# Patient Record
Sex: FEMALE | Race: WHITE | NOT HISPANIC OR LATINO
[De-identification: names, ages, dates, MRNs, and addresses within clinical notes are randomized per-mention and may not be internally consistent; named-entity substitution may affect disease eponyms.]

---

## 2021-10-12 ENCOUNTER — RESULT REVIEW (OUTPATIENT)
Age: 76
End: 2021-10-12

## 2022-08-04 ENCOUNTER — FORM ENCOUNTER (OUTPATIENT)
Age: 77
End: 2022-08-04

## 2022-08-05 PROBLEM — Z00.00 ENCOUNTER FOR PREVENTIVE HEALTH EXAMINATION: Status: ACTIVE | Noted: 2022-08-05

## 2024-05-23 ENCOUNTER — NON-APPOINTMENT (OUTPATIENT)
Age: 79
End: 2024-05-23

## 2024-07-06 ENCOUNTER — NON-APPOINTMENT (OUTPATIENT)
Age: 79
End: 2024-07-06

## 2025-03-31 ENCOUNTER — INPATIENT (INPATIENT)
Facility: HOSPITAL | Age: 80
LOS: 3 days | Discharge: HOME | End: 2025-04-04
Attending: INTERNAL MEDICINE | Admitting: INTERNAL MEDICINE
Payer: MEDICARE

## 2025-03-31 VITALS
TEMPERATURE: 98 F | HEART RATE: 80 BPM | RESPIRATION RATE: 19 BRPM | HEIGHT: 62 IN | DIASTOLIC BLOOD PRESSURE: 52 MMHG | WEIGHT: 171.96 LBS | SYSTOLIC BLOOD PRESSURE: 121 MMHG | OXYGEN SATURATION: 98 %

## 2025-03-31 DIAGNOSIS — K12.2 CELLULITIS AND ABSCESS OF MOUTH: ICD-10-CM

## 2025-03-31 PROCEDURE — G0545: CPT

## 2025-03-31 PROCEDURE — 99223 1ST HOSP IP/OBS HIGH 75: CPT

## 2025-03-31 PROCEDURE — 99233 SBSQ HOSP IP/OBS HIGH 50: CPT

## 2025-03-31 PROCEDURE — G0316 PROLONG INPT EVAL ADD15 M: CPT

## 2025-03-31 NOTE — H&P ADULT - NSHPLABSRESULTS_GEN_ALL_CORE
7.6    3.44  )-----------( 163      ( 01 Apr 2025 01:25 )             22.0     04-01    132[L]  |  100  |  10  ----------------------------<  112[H]  3.7   |  18[L]  |  0.77    Ca    7.7[L]      01 Apr 2025 01:25  Phos  1.4     04-01  Mg     2.20     04-01    TPro  6.1  /  Alb  3.0[L]  /  TBili  0.3  /  DBili  x   /  AST  21  /  ALT  13  /  AlkPhos  84  04-01      Urinalysis Basic - ( 01 Apr 2025 01:25 )    Color: x / Appearance: x / SG: x / pH: x  Gluc: 112 mg/dL / Ketone: x  / Bili: x / Urobili: x   Blood: x / Protein: x / Nitrite: x   Leuk Esterase: x / RBC: x / WBC x   Sq Epi: x / Non Sq Epi: x / Bacteria: x    RADIOLOGY, EKG & ADDITIONAL TESTS: Reviewed.

## 2025-03-31 NOTE — H&P ADULT - PROBLEM SELECTOR PLAN 3
CTA chest (03/28/2025) demonstrated no acute PE, diffuse osteosclerotic metastatic disease, 4 x 2.6 cm mass inferior quadrant left breast containing coarse calcifications and 0.7 cm short axis left axillary LN. CTA chest (03/28/2025) demonstrated no acute PE, diffuse osteosclerotic metastatic disease, 4 x 2.6 cm mass inferior quadrant left breast containing coarse calcifications and 0.7 cm short axis left axillary LN Hx of stage IV breast cancer. ER+/AR+/Her2 negative with metastases to the bones, ?colon on Ibrance/Femara since 11/2021 now with L3 lesion on Xgeva  Follows with Dr. Cunha at [facility]  Recommended to hold Ibrance during hospitalization as per Oncology recommendations   CTA chest (03/28/2025) demonstrated no acute PE, diffuse osteosclerotic metastatic disease, 4 x 2.6 cm mass inferior quadrant left breast containing coarse calcifications and 0.7 cm short axis left axillary LN    Letrozole 2.5 mg daily

## 2025-03-31 NOTE — H&P ADULT - NSHPREVIEWOFSYSTEMS_GEN_ALL_CORE
CONSTITUTIONAL: No weakness, fevers or chills  EYES/ENT: No visual changes; No dysphagia; No sore throat; No rhinorrhea; No sinus pain/pressure  NECK: No pain or stiffness  RESPIRATORY: No cough, wheezing, hemoptysis; No shortness of breath  CARDIOVASCULAR: No chest pain or palpitations; No lower extremity edema  GASTROINTESTINAL: No abdominal or epigastric pain. No nausea, vomiting, or hematemesis; No diarrhea or constipation. No melena or hematochezia.  GENITOURINARY: No dysuria, frequency or hematuria  NEUROLOGICAL: No numbness, paresthesias, or weakness; No HA; No LH/dizziness  SKIN: No itching, burning, rashes, or lesions

## 2025-03-31 NOTE — H&P ADULT - PROBLEM SELECTOR PLAN 6
Cr: 1.26 --> 0.96 Na: 130 --> 129   Serum osm: 274 Na: 132    Follow urine studies   Serum osm pending   Monitor Na

## 2025-03-31 NOTE — H&P ADULT - NSHPPHYSICALEXAM_GEN_ALL_CORE
General: Elderly woman lying in bed comfortably. Is in no acute distress  Eyes: PERRL, EOMI  Resp: CTA b/l. No wheezing, rales or rhonchi  CVS: RRR. S1 & S2+. No murmurs. rubs or gallops  GI: Soft. NDNT. Bowel sounds x 4 quadrants +  Extremities: No edema. cyanosis or clubbing  Neuro: Alert and oriented x 3. Motor and sensory intact General: Elderly woman lying in bed comfortably. Is in no acute distress  Eyes: PERRL, EOMI  HEENT: Submandibular erythema with purulent drainage   Resp: CTA b/l. No wheezing, rales or rhonchi  CVS: RRR. S1 & S2+. No murmurs. rubs or gallops  GI: Soft. NDNT. Bowel sounds x 4 quadrants +  Extremities: No edema. cyanosis or clubbing  Neuro: Alert and oriented x 3. Motor and sensory intact General: Elderly woman lying in bed comfortably. Is in no acute distress  Eyes: PERRL, EOMI  HEENT: Submandibular erythema, tenderness and swelling with scab and purulent drainage.   Resp: CTA b/l. No wheezing, rales or rhonchi  CVS: RRR. S1 & S2+. No murmurs. rubs or gallops  GI: Soft. NDNT. Bowel sounds x 4 quadrants +  Extremities: No edema. cyanosis or clubbing  Neuro: Alert and oriented x 3. Motor and sensory intact

## 2025-03-31 NOTE — H&P ADULT - HISTORY OF PRESENT ILLNESS
78 y/o F with PMHx of metastatic breast cancer (follows with [facility]), HTN, CAD (s/p 1 stent in 2013) presented to the ED at [facility] complaining of 1 week of dyspnea along with several days of submandibular tenderness and erythema. She stated that she has been experiencing dyspnea at rest for 1 week with worsening dyspnea on exertion. She also has been experiencing dental pain and increasing tenderness in the submandibular region with overlying erythema. Dental pain was 5/10 and well controlled with Tylenol at home. She denied fevers/chills or recent dental procedure. She also has occasional leg swelling for which she was briefly taking Lasix for but stopped due to low sodium. In [place] ED patient was noted to be in AF with RVR which resolved after Lopressor was administered.  78 y/o F with PMHx of metastatic breast cancer (follows with [facility]), HTN, CAD (s/p 1 stent in 2013) presented to the ED at [facility] complaining of 1 week of dyspnea along with several days of submandibular tenderness and erythema. She stated that she has been experiencing dyspnea at rest for 1 week with worsening dyspnea on exertion. She also has been experiencing dental pain and increasing tenderness in the submandibular region with overlying erythema. Dental pain was 5/10 and well controlled with Tylenol at home. She denied fevers/chills or recent dental procedure. She also has occasional leg swelling for which she was briefly taking Lasix for but stopped due to low sodium. In [place] ED patient was noted to be in AF with RVR and received Lopressor which slowed down her HR and eventually patient spontaneously went back into NSR. She was evaluated by Cardiology at [place] who recommended to monitor and obtain TTE.   She was also started on vancomycin and Zosyn for management of submandibular cellulitis. She was noted to have an open wound in the submandibular region with several small openings with purulent drainage. ID was consulted and recommended transfer to tertiary facility for possible surgical intervention as she has a large submandibular abscess/furuncle of dental origin and to continue Zosyn and vancomycin.   Case was discussed with [facility] and ENT (Dr. Dalal) will evaluate the patient. She was discharged from [facility] on 03/31/2025.     She also had an OMKAR and hyponatremia for which she was treated with gentle IVF.   She was also evaluated by GI at [place] due to 1 month history of black stool and noted to be anemic during admission. She was started on Protonix BID and GI recommended eventual EGD and Colonoscopy to rule out PUD and neoplasia but that submandibular infection should be evaluated first.  78 y/o F with PMHx of metastatic breast cancer (follows with [facility]), HTN, CAD (s/p 1 stent in 2013) presented to the ED at [facility] complaining of 1 week of dyspnea along with several days of submandibular tenderness and erythema. She stated that she has been experiencing dyspnea at rest for 1 week with worsening dyspnea on exertion. She also has been experiencing dental pain and increasing tenderness in the submandibular region with overlying erythema. Dental pain was 5/10 and well controlled with Tylenol at home. She denied fevers/chills or recent dental procedure. She also has occasional leg swelling for which she was briefly taking Lasix for but stopped due to low sodium. In [place] ED patient was noted to be in AF with RVR and received Lopressor which slowed down her HR and eventually patient spontaneously went back into NSR. She was evaluated by Cardiology at [place] who recommended to monitor and obtain TTE. She also had an OMKAR and hyponatremia for which she was treated with gentle IVF. She was also evaluated by GI at [place] due to 1 month history of black stool and noted to be anemic during admission. She was started on Protonix BID and GI recommended eventual EGD and Colonoscopy to rule out PUD and neoplasia but that submandibular infection should be evaluated first. She was also started on vancomycin and Zosyn for management of submandibular cellulitis. She was noted to have an open wound in the submandibular region with several small openings with purulent drainage. ID was consulted and recommended transfer to tertiary facility for possible surgical intervention as she has a large submandibular abscess/furuncle of dental origin and to continue Zosyn and vancomycin.   Case was discussed with [facility] and ENT (Dr. Dalal) will evaluate the patient. She was discharged from [facility] on 03/31/2025.

## 2025-03-31 NOTE — PATIENT PROFILE ADULT - NSTRANSFERBELONGINGSRESP_GEN_A_NUR
January 17, 2019      Vic Muñoz MD  [id number]  [place] [address]           [facility]  [address]  [place] [address]  Phone: [phone]  Fax: [phone]          Patient: Ottoniel Ross   MR Number: [id number]   YOB: 1962   Date of Visit: 1/17/2019       Dear Dr. Vic Muñoz:    Thank you for referring Ottoniel Ross to me for evaluation. Attached you will find relevant portions of my assessment and plan of care.    If you have questions, please do not hesitate to call me. I look forward to following Ottoniel Ross along with you.    Sincerely,    Nicole Santamaria MD    Enclosure  CC:  No Recipients    If you would like to receive this communication electronically, please contact [email or web address] or [phone] to request more information on [email or web address] Link access.    For providers and/or their staff who would like to refer a patient to [facility], please contact us through our one-stop-shop provider referral line, [facility], at [phone].    If you feel you have received this communication in error or would no longer like to receive these types of communications, please e-mail [email or web address]         
yes

## 2025-03-31 NOTE — PATIENT PROFILE ADULT - NSPROMEDSADMININFO_GEN_A_NUR
How Severe Is Your Cyst?: moderate Is This A New Presentation, Or A Follow-Up?: Cyst Additional History: Saw PCP when it first started and he prescribed doxycycline 100mg for 14 days and had no improvement. He also took a round of augmentin without improvement. no concerns

## 2025-03-31 NOTE — H&P ADULT - PROBLEM SELECTOR PLAN 8
DVT prophylaxis:   GI prophylaxis: Protonix BID  Diet: DASH, soft bite sized  Ethics: Full code  Disposition: Pending clinical course DVT prophylaxis: Hold Eliquis pending possible intervention by ENT (last dose on 03/31/25 around 5:30 PM)  GI prophylaxis: Protonix BID  Diet: DASH, soft bite sized  Ethics: Full code  Disposition: Pending clinical course

## 2025-03-31 NOTE — PATIENT PROFILE ADULT - FALL HARM RISK - RISK INTERVENTIONS
Assistance OOB with selected safe patient handling equipment/Assistance with ambulation/Communicate Fall Risk and Risk Factors to all staff, patient, and family/Monitor for mental status changes/Monitor gait and stability/Reinforce activity limits and safety measures with patient and family/Reorient to person, place and time as needed/Review medications for side effects contributing to fall risk/Sit up slowly, dangle for a short time, stand at bedside before walking/Toileting schedule using arm’s reach rule for commode and bathroom/Use of alarms - bed, chair and/or voice tab/Visual Cue: Yellow wristband/Bed in lowest position, wheels locked, appropriate side rails in place/Call bell, personal items and telephone in reach/Instruct patient to call for assistance before getting out of bed or chair/Non-slip footwear when patient is out of bed/[place] to call system/Physically safe environment - no spills, clutter or unnecessary equipment/Purposeful Proactive Rounding/Room/bathroom lighting operational, light cord in reach

## 2025-03-31 NOTE — H&P ADULT - PROBLEM SELECTOR PLAN 2
EKG (03/29/25) demonstrated AF with RVR   pro-BNP: 3042  TSH: 0.690 EKG (03/29/25) demonstrated AF with RVR   pro-BNP: 3042  TSH: 0.690    EKG pending EKG (03/29/25) demonstrated AF with RVR   pro-BNP: 3042  TSH: 0.690  Evaluated by Cardiology who recommended to continue Eliquis   CHADsVASc score of     EKG pending  Hold Eliquis (due to possible ENT intervention) EKG (03/29/25) demonstrated AF with RVR   pro-BNP: 3042  TSH: 0.690  Evaluated by Cardiology who recommended to continue Eliquis   CHADsVASc score of 3    EKG pending  Hold Eliquis (due to possible ENT intervention)

## 2025-03-31 NOTE — H&P ADULT - PROBLEM SELECTOR PLAN 1
CT soft tissue neck C+ (03/28/2025) demonstrated submandibular space superficial and deep infiltration suggestive of cellulitis with no abscess, asymmetric fullness left aryepiglottic fold and diffuse sclerotic metastases.   ESR: 70  Lactate: 3.40 --> 2.00  Procalcitonin: 1.14   CRP: 285  Blood cultures (03/28/25) demonstrated NGTD  Wound culture (03/29/25) demonstrated growth with few Parvimonas micra and Peptostreptococcus species CT soft tissue neck C+ (03/28/2025) demonstrated submandibular space superficial and deep infiltration suggestive of cellulitis with no abscess, asymmetric fullness left aryepiglottic fold and diffuse sclerotic metastases.   ESR: 70  Lactate: 3.40 --> 2.00  Procalcitonin: 1.14   CRP: 285  Blood cultures (03/28/25) demonstrated NGTD  Wound culture (03/29/25) demonstrated growth with few Parvimonas micra and Peptostreptococcus species    Zosyn   Vancomycin  Monitor VT or AUC and adjust accordingly   Monitor BMP  ID consult in the AM   ENT consult in the AM Transferred to [facility] for ENT evaluation for possible I & D of possible submandibular abscess/furuncle   CT soft tissue neck C+ (03/28/2025) demonstrated submandibular space superficial and deep infiltration suggestive of cellulitis with no abscess, asymmetric fullness left aryepiglottic fold and diffuse sclerotic metastases.   ESR: 70  Lactate: 3.40 --> 2.00  Procalcitonin: 1.14   CRP: 285  Blood cultures (03/28/25) demonstrated NGTD  Wound culture (03/29/25) demonstrated growth with few Parvimonas micra and Peptostreptococcus species    Zosyn   Vancomycin  Monitor VT or AUC and adjust accordingly   Monitor BMP  ID consult in the AM   ENT consult in the AM Transferred to [facility] for ENT evaluation for possible I & D of possible submandibular abscess/furuncle   CT soft tissue neck C+ (03/28/2025) demonstrated submandibular space superficial and deep infiltration suggestive of cellulitis with no abscess, asymmetric fullness left aryepiglottic fold and diffuse sclerotic metastases.   ESR: 70  Lactate: 3.40 --> 2.00  Procalcitonin: 1.14   CRP: 285  Blood cultures (03/28/25) demonstrated NGTD  Wound culture (03/29/25) demonstrated growth with few Parvimonas micra and Peptostreptococcus species    Zosyn 4.5 g q 8   Vancomycin 1 gm q 24   Monitor VT or AUC and adjust accordingly   Monitor BMP  ID consult in the AM   ENT consult in the AM

## 2025-03-31 NOTE — H&P ADULT - PROBLEM SELECTOR PLAN 4
Amlodipine 5 mg daily   Monitor BP and adjust accordingly Home medications: Amlodipine 5 mg daily     Amlodipine 5 mg daily   Monitor BP and adjust accordingly

## 2025-03-31 NOTE — H&P ADULT - ASSESSMENT
78 y/o F with PMHx of metastatic breast cancer (follows with [facility]), HTN, CAD (s/p 1 stent in 2013) presented to the ED at [facility] complaining of 1 week of dyspnea along with several days of submandibular tenderness and erythema. She stated that she has been experiencing dyspnea at rest for 1 week with worsening dyspnea on exertion. She also has been experiencing dental pain and increasing tenderness in the submandibular region with overlying erythema.  ID was consulted and recommended transfer to tertiary facility for possible surgical intervention as she has a large submandibular abscess/furuncle of dental origin and to continue Zosyn and vancomycin. Case was discussed with [facility] and ENT (Dr. Dalal) will evaluate the patient. She was discharged from [facility] on 03/31/2025. She will be admitted for management of submandibular cellulitis and for evaluation by ID and ENT.

## 2025-03-31 NOTE — H&P ADULT - PROBLEM SELECTOR PLAN 5
Hgb: 9.9 --> 8.0  Iron: 27   TIBC: 188.86  Ferritin: 344  B12: 2447   FA: 13.20 Hgb: 9.9 --> 8.0  Iron: 27   TIBC: 188.86  Ferritin: 344  B12: 2447   FA: 13.20    GI consult in the AM Hgb: 9.9 --> 8.0 --> 7.6   Iron: 27   TIBC: 188.86  Ferritin: 344  B12: 2447   FA: 13.20  Complained of 1 month history of black stool   Evaluated by GI at [facility] who recommended EGD/Colonoscopy once submandibular cellulitis is treated and to continue Protonix BID   Continues to have black stool     Monitor H&H   Maintain active type and screen   Transfuse if Hgb<8 g/dL   Monitor for further active signs of bleeding   Protonix BID   GI consult in the AM

## 2025-04-01 DIAGNOSIS — I10 ESSENTIAL (PRIMARY) HYPERTENSION: ICD-10-CM

## 2025-04-01 DIAGNOSIS — N17.9 ACUTE KIDNEY FAILURE, UNSPECIFIED: ICD-10-CM

## 2025-04-01 DIAGNOSIS — I48.91 UNSPECIFIED ATRIAL FIBRILLATION: ICD-10-CM

## 2025-04-01 DIAGNOSIS — E87.1 HYPO-OSMOLALITY AND HYPONATREMIA: ICD-10-CM

## 2025-04-01 DIAGNOSIS — Z29.9 ENCOUNTER FOR PROPHYLACTIC MEASURES, UNSPECIFIED: ICD-10-CM

## 2025-04-01 DIAGNOSIS — I25.10 ATHEROSCLEROTIC HEART DISEASE OF NATIVE CORONARY ARTERY WITHOUT ANGINA PECTORIS: ICD-10-CM

## 2025-04-01 DIAGNOSIS — K12.2 CELLULITIS AND ABSCESS OF MOUTH: ICD-10-CM

## 2025-04-01 DIAGNOSIS — D64.9 ANEMIA, UNSPECIFIED: ICD-10-CM

## 2025-04-01 LAB
ADD ON TEST-SPECIMEN IN LAB: SIGNIFICANT CHANGE UP
ALBUMIN SERPL ELPH-MCNC: 3 G/DL — LOW (ref 3.3–5)
ALP SERPL-CCNC: 84 U/L — SIGNIFICANT CHANGE UP (ref 40–120)
ALT FLD-CCNC: 13 U/L — SIGNIFICANT CHANGE UP (ref 4–33)
ANION GAP SERPL CALC-SCNC: 14 MMOL/L — SIGNIFICANT CHANGE UP (ref 7–14)
ANION GAP SERPL CALC-SCNC: 16 MMOL/L — HIGH (ref 7–14)
APTT BLD: 33.9 SEC — SIGNIFICANT CHANGE UP (ref 24.5–35.6)
AST SERPL-CCNC: 21 U/L — SIGNIFICANT CHANGE UP (ref 4–32)
BILIRUB SERPL-MCNC: 0.3 MG/DL — SIGNIFICANT CHANGE UP (ref 0.2–1.2)
BLD GP AB SCN SERPL QL: NEGATIVE — SIGNIFICANT CHANGE UP
BUN SERPL-MCNC: 10 MG/DL — SIGNIFICANT CHANGE UP (ref 7–23)
BUN SERPL-MCNC: 8 MG/DL — SIGNIFICANT CHANGE UP (ref 7–23)
CALCIUM SERPL-MCNC: 7.6 MG/DL — LOW (ref 8.4–10.5)
CALCIUM SERPL-MCNC: 7.7 MG/DL — LOW (ref 8.4–10.5)
CHLORIDE SERPL-SCNC: 100 MMOL/L — SIGNIFICANT CHANGE UP (ref 98–107)
CHLORIDE SERPL-SCNC: 102 MMOL/L — SIGNIFICANT CHANGE UP (ref 98–107)
CO2 SERPL-SCNC: 18 MMOL/L — LOW (ref 22–31)
CO2 SERPL-SCNC: 18 MMOL/L — LOW (ref 22–31)
CREAT SERPL-MCNC: 0.73 MG/DL — SIGNIFICANT CHANGE UP (ref 0.5–1.3)
CREAT SERPL-MCNC: 0.77 MG/DL — SIGNIFICANT CHANGE UP (ref 0.5–1.3)
EGFR: 77 ML/MIN/1.73M2 — SIGNIFICANT CHANGE UP
EGFR: 77 ML/MIN/1.73M2 — SIGNIFICANT CHANGE UP
EGFR: 83 ML/MIN/1.73M2 — SIGNIFICANT CHANGE UP
EGFR: 83 ML/MIN/1.73M2 — SIGNIFICANT CHANGE UP
FERRITIN SERPL-MCNC: 565 NG/ML — HIGH (ref 13–330)
GLUCOSE SERPL-MCNC: 108 MG/DL — HIGH (ref 70–99)
GLUCOSE SERPL-MCNC: 112 MG/DL — HIGH (ref 70–99)
HCT VFR BLD CALC: 21.9 % — LOW (ref 34.5–45)
HCT VFR BLD CALC: 22 % — LOW (ref 34.5–45)
HGB BLD-MCNC: 7.1 G/DL — LOW (ref 11.5–15.5)
HGB BLD-MCNC: 7.6 G/DL — LOW (ref 11.5–15.5)
INR BLD: 1.33 RATIO — HIGH (ref 0.85–1.16)
IRON SATN MFR SERPL: 22 % — SIGNIFICANT CHANGE UP (ref 14–50)
IRON SATN MFR SERPL: 41 UG/DL — SIGNIFICANT CHANGE UP (ref 30–160)
MAGNESIUM SERPL-MCNC: 2.2 MG/DL — SIGNIFICANT CHANGE UP (ref 1.6–2.6)
MAGNESIUM SERPL-MCNC: 2.3 MG/DL — SIGNIFICANT CHANGE UP (ref 1.6–2.6)
MCHC RBC-ENTMCNC: 32.4 G/DL — SIGNIFICANT CHANGE UP (ref 32–36)
MCHC RBC-ENTMCNC: 34.5 G/DL — SIGNIFICANT CHANGE UP (ref 32–36)
MCHC RBC-ENTMCNC: 38.4 PG — HIGH (ref 27–34)
MCHC RBC-ENTMCNC: 39.4 PG — HIGH (ref 27–34)
MCV RBC AUTO: 114 FL — HIGH (ref 80–100)
MCV RBC AUTO: 118.4 FL — HIGH (ref 80–100)
NRBC # BLD AUTO: 0 K/UL — SIGNIFICANT CHANGE UP (ref 0–0)
NRBC # BLD AUTO: 0.02 K/UL — HIGH (ref 0–0)
NRBC # FLD: 0 K/UL — SIGNIFICANT CHANGE UP (ref 0–0)
NRBC # FLD: 0.02 K/UL — HIGH (ref 0–0)
NRBC BLD AUTO-RTO: 0 /100 WBCS — SIGNIFICANT CHANGE UP (ref 0–0)
NRBC BLD AUTO-RTO: 0 /100 WBCS — SIGNIFICANT CHANGE UP (ref 0–0)
OSMOLALITY SERPL: 282 MOSM/KG — SIGNIFICANT CHANGE UP (ref 275–295)
PHOSPHATE SERPL-MCNC: 1.4 MG/DL — LOW (ref 2.5–4.5)
PHOSPHATE SERPL-MCNC: 1.5 MG/DL — LOW (ref 2.5–4.5)
PLATELET # BLD AUTO: 134 K/UL — LOW (ref 150–400)
PLATELET # BLD AUTO: 163 K/UL — SIGNIFICANT CHANGE UP (ref 150–400)
POTASSIUM SERPL-MCNC: 3.7 MMOL/L — SIGNIFICANT CHANGE UP (ref 3.5–5.3)
POTASSIUM SERPL-MCNC: 3.8 MMOL/L — SIGNIFICANT CHANGE UP (ref 3.5–5.3)
POTASSIUM SERPL-SCNC: 3.7 MMOL/L — SIGNIFICANT CHANGE UP (ref 3.5–5.3)
POTASSIUM SERPL-SCNC: 3.8 MMOL/L — SIGNIFICANT CHANGE UP (ref 3.5–5.3)
PROT SERPL-MCNC: 6.1 G/DL — SIGNIFICANT CHANGE UP (ref 6–8.3)
PROTHROM AB SERPL-ACNC: 15.8 SEC — HIGH (ref 9.9–13.4)
RBC # BLD: 1.85 M/UL — LOW (ref 3.8–5.2)
RBC # BLD: 1.93 M/UL — LOW (ref 3.8–5.2)
RBC # FLD: 14 % — SIGNIFICANT CHANGE UP (ref 10.3–14.5)
RBC # FLD: 14.1 % — SIGNIFICANT CHANGE UP (ref 10.3–14.5)
RH IG SCN BLD-IMP: NEGATIVE — SIGNIFICANT CHANGE UP
SODIUM SERPL-SCNC: 132 MMOL/L — LOW (ref 135–145)
SODIUM SERPL-SCNC: 136 MMOL/L — SIGNIFICANT CHANGE UP (ref 135–145)
TIBC SERPL-MCNC: 183 UG/DL — LOW (ref 220–430)
UIBC SERPL-MCNC: 142 UG/DL — SIGNIFICANT CHANGE UP (ref 110–370)
WBC # BLD: 2.92 K/UL — LOW (ref 3.8–10.5)
WBC # BLD: 3.44 K/UL — LOW (ref 3.8–10.5)
WBC # FLD AUTO: 2.92 K/UL — LOW (ref 3.8–10.5)
WBC # FLD AUTO: 3.44 K/UL — LOW (ref 3.8–10.5)

## 2025-04-01 PROCEDURE — 99233 SBSQ HOSP IP/OBS HIGH 50: CPT

## 2025-04-01 PROCEDURE — 99222 1ST HOSP IP/OBS MODERATE 55: CPT | Mod: GC

## 2025-04-01 PROCEDURE — 99223 1ST HOSP IP/OBS HIGH 75: CPT | Mod: FS,25

## 2025-04-01 RX ORDER — VANCOMYCIN HCL IN 5 % DEXTROSE 1.5G/250ML
1000 PLASTIC BAG, INJECTION (ML) INTRAVENOUS EVERY 24 HOURS
Refills: 0 | Status: DISCONTINUED | OUTPATIENT
Start: 2025-04-01 | End: 2025-04-02

## 2025-04-01 RX ORDER — CYANOCOBALAMIN 1000 UG/ML
2000 INJECTION INTRAMUSCULAR; SUBCUTANEOUS
Refills: 0 | DISCHARGE

## 2025-04-01 RX ORDER — OXYCODONE HYDROCHLORIDE 30 MG/1
5 TABLET ORAL EVERY 6 HOURS
Refills: 0 | Status: DISCONTINUED | OUTPATIENT
Start: 2025-04-01 | End: 2025-04-03

## 2025-04-01 RX ORDER — LETROZOLE 2.5 MG/1
2.5 TABLET, FILM COATED ORAL DAILY
Refills: 0 | Status: DISCONTINUED | OUTPATIENT
Start: 2025-04-01 | End: 2025-04-04

## 2025-04-01 RX ORDER — PIPERACILLIN-TAZO-DEXTROSE,ISO 3.375G/5
4.5 IV SOLUTION, PIGGYBACK PREMIX FROZEN(ML) INTRAVENOUS EVERY 8 HOURS
Refills: 0 | Status: DISCONTINUED | OUTPATIENT
Start: 2025-04-01 | End: 2025-04-02

## 2025-04-01 RX ORDER — ACETAMINOPHEN 500 MG/5ML
1000 LIQUID (ML) ORAL ONCE
Refills: 0 | Status: DISCONTINUED | OUTPATIENT
Start: 2025-04-01 | End: 2025-04-01

## 2025-04-01 RX ORDER — MELATONIN 5 MG
3 TABLET ORAL AT BEDTIME
Refills: 0 | Status: DISCONTINUED | OUTPATIENT
Start: 2025-04-01 | End: 2025-04-04

## 2025-04-01 RX ORDER — ATORVASTATIN CALCIUM 80 MG/1
20 TABLET, FILM COATED ORAL AT BEDTIME
Refills: 0 | Status: DISCONTINUED | OUTPATIENT
Start: 2025-04-01 | End: 2025-04-04

## 2025-04-01 RX ORDER — AMLODIPINE BESYLATE 10 MG/1
1 TABLET ORAL
Refills: 0 | DISCHARGE

## 2025-04-01 RX ORDER — CYANOCOBALAMIN 1000 UG/ML
1000 INJECTION INTRAMUSCULAR; SUBCUTANEOUS DAILY
Refills: 0 | Status: DISCONTINUED | OUTPATIENT
Start: 2025-04-01 | End: 2025-04-04

## 2025-04-01 RX ORDER — ONDANSETRON HCL/PF 4 MG/2 ML
4 VIAL (ML) INJECTION EVERY 8 HOURS
Refills: 0 | Status: DISCONTINUED | OUTPATIENT
Start: 2025-04-01 | End: 2025-04-04

## 2025-04-01 RX ORDER — METOPROLOL SUCCINATE 50 MG/1
25 TABLET, EXTENDED RELEASE ORAL EVERY 6 HOURS
Refills: 0 | Status: DISCONTINUED | OUTPATIENT
Start: 2025-04-01 | End: 2025-04-04

## 2025-04-01 RX ORDER — MAGNESIUM, ALUMINUM HYDROXIDE 200-200 MG
30 TABLET,CHEWABLE ORAL EVERY 4 HOURS
Refills: 0 | Status: DISCONTINUED | OUTPATIENT
Start: 2025-04-01 | End: 2025-04-04

## 2025-04-01 RX ORDER — ATORVASTATIN CALCIUM 80 MG/1
1 TABLET, FILM COATED ORAL
Refills: 0 | DISCHARGE

## 2025-04-01 RX ORDER — LETROZOLE 2.5 MG/1
1 TABLET, FILM COATED ORAL
Refills: 0 | DISCHARGE

## 2025-04-01 RX ORDER — POTASSIUM PHOSPHATE, MONOBASIC POTASSIUM PHOSPHATE, DIBASIC INJECTION, 236; 224 MG/ML; MG/ML
30 SOLUTION, CONCENTRATE INTRAVENOUS ONCE
Refills: 0 | Status: COMPLETED | OUTPATIENT
Start: 2025-04-01 | End: 2025-04-01

## 2025-04-01 RX ORDER — AMLODIPINE BESYLATE 10 MG/1
5 TABLET ORAL DAILY
Refills: 0 | Status: DISCONTINUED | OUTPATIENT
Start: 2025-04-01 | End: 2025-04-04

## 2025-04-01 RX ORDER — DULOXETINE 20 MG/1
1 CAPSULE, DELAYED RELEASE ORAL
Refills: 0 | DISCHARGE

## 2025-04-01 RX ORDER — DULOXETINE 20 MG/1
30 CAPSULE, DELAYED RELEASE ORAL DAILY
Refills: 0 | Status: DISCONTINUED | OUTPATIENT
Start: 2025-04-01 | End: 2025-04-04

## 2025-04-01 RX ADMIN — LETROZOLE 2.5 MILLIGRAM(S): 2.5 TABLET, FILM COATED ORAL at 13:06

## 2025-04-01 RX ADMIN — METOPROLOL SUCCINATE 25 MILLIGRAM(S): 50 TABLET, EXTENDED RELEASE ORAL at 05:49

## 2025-04-01 RX ADMIN — POTASSIUM PHOSPHATE, MONOBASIC POTASSIUM PHOSPHATE, DIBASIC INJECTION, 83.33 MILLIMOLE(S): 236; 224 SOLUTION, CONCENTRATE INTRAVENOUS at 18:21

## 2025-04-01 RX ADMIN — Medication 40 MILLIGRAM(S): at 05:49

## 2025-04-01 RX ADMIN — Medication 40 MILLIGRAM(S): at 18:28

## 2025-04-01 RX ADMIN — Medication 25 GRAM(S): at 13:50

## 2025-04-01 RX ADMIN — Medication 25 GRAM(S): at 04:35

## 2025-04-01 RX ADMIN — Medication 25 GRAM(S): at 21:12

## 2025-04-01 RX ADMIN — AMLODIPINE BESYLATE 5 MILLIGRAM(S): 10 TABLET ORAL at 05:49

## 2025-04-01 RX ADMIN — Medication 85 MILLIMOLE(S): at 05:48

## 2025-04-01 RX ADMIN — ATORVASTATIN CALCIUM 20 MILLIGRAM(S): 80 TABLET, FILM COATED ORAL at 21:12

## 2025-04-01 RX ADMIN — METOPROLOL SUCCINATE 25 MILLIGRAM(S): 50 TABLET, EXTENDED RELEASE ORAL at 13:05

## 2025-04-01 RX ADMIN — DULOXETINE 30 MILLIGRAM(S): 20 CAPSULE, DELAYED RELEASE ORAL at 13:06

## 2025-04-01 RX ADMIN — Medication 250 MILLIGRAM(S): at 16:04

## 2025-04-01 RX ADMIN — CYANOCOBALAMIN 1000 MICROGRAM(S): 1000 INJECTION INTRAMUSCULAR; SUBCUTANEOUS at 13:06

## 2025-04-01 RX ADMIN — Medication 1000 UNIT(S): at 13:06

## 2025-04-01 NOTE — PROGRESS NOTE ADULT - ASSESSMENT
78 y/o F with PMHx of metastatic breast cancer (follows with [facility]), HTN, CAD (s/p 1 stent in 2013) presented to the ED at [facility] complaining of 1 week of dyspnea along with several days of submandibular tenderness and erythema. She stated that she has been experiencing dyspnea at rest for 1 week with worsening dyspnea on exertion. She also has been experiencing dental pain and increasing tenderness in the submandibular region with overlying erythema.  ID was consulted and recommended transfer to tertiary facility for possible surgical intervention as she has a large submandibular abscess/furuncle of dental origin and to continue Zosyn and vancomycin. Case was discussed with [facility] and ENT (Dr. Dalal) will evaluate the patient. She was discharged from [facility] on 03/31/2025. She will be admitted for management of submandibular cellulitis and for evaluation by ID and ENT.

## 2025-04-01 NOTE — CONSULT NOTE ADULT - SUBJECTIVE AND OBJECTIVE BOX
Reason for consult: metastatic breast cancer     HPI:  78 y/o F with PMHx of metastatic breast cancer (follows with [facility]), HTN, CAD (s/p 1 stent in 2013) presented to the ED at [facility] complaining of 1 week of dyspnea along with several days of submandibular tenderness and erythema. She stated that she has been experiencing dyspnea at rest for 1 week with worsening dyspnea on exertion. She also has been experiencing dental pain and increasing tenderness in the submandibular region with overlying erythema. Dental pain was 5/10 and well controlled with Tylenol at home. She denied fevers/chills or recent dental procedure. She also has occasional leg swelling for which she was briefly taking Lasix for but stopped due to low sodium. In [place] ED patient was noted to be in AF with RVR and received Lopressor which slowed down her HR and eventually patient spontaneously went back into NSR. She was evaluated by Cardiology at [place] who recommended to monitor and obtain TTE. She also had an OMKAR and hyponatremia for which she was treated with gentle IVF. She was also evaluated by GI at [place] due to 1 month history of black stool and noted to be anemic during admission. She was started on Protonix BID and GI recommended eventual EGD and Colonoscopy to rule out PUD and neoplasia but that submandibular infection should be evaluated first. She was also started on vancomycin and Zosyn for management of submandibular cellulitis. She was noted to have an open wound in the submandibular region with several small openings with purulent drainage. ID was consulted and recommended transfer to tertiary facility for possible surgical intervention as she has a large submandibular abscess/furuncle of dental origin and to continue Zosyn and vancomycin.   Case was discussed with [facility] and ENT (Dr. Dalal) will evaluate the patient. She was discharged from [facility] on 03/31/2025.      (31 Mar 2025 23:46)    Hematology/Oncology consulted on this 79 year old female with metastatic breast cancer who was transferred from [facility] for evaluation/treatment of submandibular wound. Patient is under care of Dr. Bam Cunha of [facility] for management of her stage IV breast cancer metastatic to bones and ?colon. She was initially diagnosed via biopsy of right iliac confirming diagnosis, and has been on palbociclib and letrozole since.   Patient seen this afternoon, with her two daughters at bedside. She has some discomfort to chin area where the wound is, but states that since it started draining, the swelling and pain has improved drastically. No other complaints currently.     PAST MEDICAL & SURGICAL HISTORY:  Breast cancer      HTN (hypertension)      CAD (coronary artery disease)          FAMILY HISTORY:      Alochol: Denied  Smoking: Nonsmoker  Drug Use: Denied  Marital Status:         Allergies    Vicodin (Unknown)    Intolerances        MEDICATIONS  (STANDING):  amLODIPine   Tablet 5 milliGRAM(s) Oral daily  atorvastatin 20 milliGRAM(s) Oral at bedtime  cholecalciferol 1000 Unit(s) Oral daily  cyanocobalamin 1000 MICROGram(s) Oral daily  DULoxetine 30 milliGRAM(s) Oral daily  letrozole 2.5 milliGRAM(s) Oral daily  metoprolol tartrate 25 milliGRAM(s) Oral every 6 hours  pantoprazole  Injectable 40 milliGRAM(s) IV Push every 12 hours  piperacillin/tazobactam IVPB.. 4.5 Gram(s) IV Intermittent every 8 hours  potassium phosphate IVPB 30 milliMole(s) IV Intermittent once  vancomycin  IVPB 1000 milliGRAM(s) IV Intermittent every 24 hours    MEDICATIONS  (PRN):  aluminum hydroxide/magnesium hydroxide/simethicone Suspension 30 milliLiter(s) Oral every 4 hours PRN Dyspepsia  melatonin 3 milliGRAM(s) Oral at bedtime PRN Insomnia  ondansetron Injectable 4 milliGRAM(s) IV Push every 8 hours PRN Nausea and/or Vomiting  oxyCODONE    IR 5 milliGRAM(s) Oral every 6 hours PRN for severe pain      ROS  No fever, sweats, chills  No epistaxis, HA, sore throat  No CP, SOB, cough, sputum  No n/v/d, abd pain, melena, hematochezia  No edema  No rash  No anxiety  No back pain, joint pain  No bleeding, bruising  No dysuria, hematuria  +chin/submandibular area discomfort     T(C): 36.5 (04-01-25 @ 12:59), Max: 36.8 (03-31-25 @ 22:41)  HR: 75 (04-01-25 @ 12:59) (75 - 85)  BP: 117/58 (04-01-25 @ 12:59) (117/58 - 149/56)  RR: 18 (04-01-25 @ 12:59) (18 - 19)  SpO2: 100% (04-01-25 @ 12:59) (98% - 100%)  Wt(kg): --    PE  NAD  Awake, alert  Anicteric, MMM  purulent drainage from submandibular region w/ swelling   No c/c/e  No rash grossly  FROM                          7.1    2.92  )-----------( 134      ( 01 Apr 2025 06:25 )             21.9       04-01    136  |  102  |  8   ----------------------------<  108[H]  3.8   |  18[L]  |  0.73    Ca    7.6[L]      01 Apr 2025 06:25  Phos  1.5     04-01  Mg     2.30     04-01    TPro  6.1  /  Alb  3.0[L]  /  TBili  0.3  /  DBili  x   /  AST  21  /  ALT  13  /  AlkPhos  84  04-01

## 2025-04-01 NOTE — CONSULT NOTE ADULT - ASSESSMENT
79 year old female with metastatic breast Ca , HTN and afib admitted to [facility] after transfer for Submandibular cellulitis

## 2025-04-01 NOTE — PROGRESS NOTE ADULT - PROBLEM SELECTOR PLAN 3
Hx of stage IV breast cancer. ER+/WV+/Her2 negative with metastases to the bones, ?colon on Ibrance/Femara since 11/2021 now with L3 lesion on Xgeva  Follows with Dr. Cunha at [facility]  Recommended to hold Ibrance during hospitalization as per Oncology recommendations   CTA chest (03/28/2025) demonstrated no acute PE, diffuse osteosclerotic metastatic disease, 4 x 2.6 cm mass inferior quadrant left breast containing coarse calcifications and 0.7 cm short axis left axillary LN    Letrozole 2.5 mg daily

## 2025-04-01 NOTE — PROGRESS NOTE ADULT - SUBJECTIVE AND OBJECTIVE BOX
PROGRESS NOTE:   Authored by Annika Gayle Ma, MD  Available on MS Teams; Pager [phone]    Patient is a 79y old  Female who presents with a chief complaint of Submandibular cellulitis (01 Apr 2025 16:01)      SUBJECTIVE / OVERNIGHT EVENTS: Pt seen and examined with ACP and 2 family members at bedside this AM. She reports ongoing black stool described as diarrhea. Has MOORE with walking to the bathroom. She otherwise denied any other symptoms. Declining blood transfusion at this time because she does not want to receive someone else's blood, feels she does not need it, denies any lightheadedness/dizziness, states she is not actively bleeding. Per family- would like to confirm melena first and r/o infectious diarrhea.     All other review of systems is negative unless indicated above.    MEDICATIONS  (STANDING):  amLODIPine   Tablet 5 milliGRAM(s) Oral daily  atorvastatin 20 milliGRAM(s) Oral at bedtime  cholecalciferol 1000 Unit(s) Oral daily  cyanocobalamin 1000 MICROGram(s) Oral daily  DULoxetine 30 milliGRAM(s) Oral daily  letrozole 2.5 milliGRAM(s) Oral daily  metoprolol tartrate 25 milliGRAM(s) Oral every 6 hours  morphine  - Injectable 1 milliGRAM(s) IV Push once  pantoprazole  Injectable 40 milliGRAM(s) IV Push every 12 hours  piperacillin/tazobactam IVPB.. 4.5 Gram(s) IV Intermittent every 8 hours  vancomycin  IVPB 1000 milliGRAM(s) IV Intermittent every 24 hours    MEDICATIONS  (PRN):  aluminum hydroxide/magnesium hydroxide/simethicone Suspension 30 milliLiter(s) Oral every 4 hours PRN Dyspepsia  melatonin 3 milliGRAM(s) Oral at bedtime PRN Insomnia  ondansetron Injectable 4 milliGRAM(s) IV Push every 8 hours PRN Nausea and/or Vomiting  oxyCODONE    IR 5 milliGRAM(s) Oral every 6 hours PRN for severe pain      CAPILLARY BLOOD GLUCOSE        I&O's Summary      PHYSICAL EXAM:  Vital Signs Last 24 Hrs  T(C): 36.5 (01 Apr 2025 18:35), Max: 36.8 (31 Mar 2025 22:41)  T(F): 97.7 (01 Apr 2025 18:35), Max: 98.2 (31 Mar 2025 22:41)  HR: 90 (01 Apr 2025 18:35) (75 - 90)  BP: 103/55 (01 Apr 2025 18:35) (103/55 - 149/56)  BP(mean): --  RR: 17 (01 Apr 2025 18:35) (17 - 19)  SpO2: 100% (01 Apr 2025 18:35) (98% - 100%)    Parameters below as of 01 Apr 2025 18:35  Patient On (Oxygen Delivery Method): room air        GENERAL: No acute distress  HEAD:  Normocephalic  EYES: Conjunctiva and sclera clear  ENT: open draining wound to submandibular area with erythema and swelling  NECK: Supple  CHEST/LUNG: CTAB  HEART: Regular rate and rhythm  ABDOMEN: Soft, non-tender, non-distended  EXTREMITIES: No clubbing, cyanosis, or edema  NEUROLOGY: A&O x 3  SKIN: No rashes or lesions to visible skin    LABS:                        7.1    2.92  )-----------( 134      ( 01 Apr 2025 06:25 )             21.9     04-01    136  |  102  |  8   ----------------------------<  108[H]  3.8   |  18[L]  |  0.73    Ca    7.6[L]      01 Apr 2025 06:25  Phos  1.5     04-01  Mg     2.30     04-01    TPro  6.1  /  Alb  3.0[L]  /  TBili  0.3  /  DBili  x   /  AST  21  /  ALT  13  /  AlkPhos  84  04-01    PT/INR - ( 01 Apr 2025 06:25 )   PT: 15.8 sec;   INR: 1.33 ratio         PTT - ( 01 Apr 2025 06:25 )  PTT:33.9 sec      Urinalysis Basic - ( 01 Apr 2025 06:25 )    Color: x / Appearance: x / SG: x / pH: x  Gluc: 108 mg/dL / Ketone: x  / Bili: x / Urobili: x   Blood: x / Protein: x / Nitrite: x   Leuk Esterase: x / RBC: x / WBC x   Sq Epi: x / Non Sq Epi: x / Bacteria: x            RADIOLOGY & ADDITIONAL TESTS: Reviewed

## 2025-04-01 NOTE — PHYSICAL THERAPY INITIAL EVALUATION ADULT - PERTINENT HX OF CURRENT PROBLEM, REHAB EVAL
Pt is an 79 year old female who presented to [facility] complaining of 1 week of dyspnea along with several days of submandibular tenderness and erythema. Transferred here for possible surgical  intervention as she has a large submandibular abscess/furuncle of dental origin.

## 2025-04-01 NOTE — PROGRESS NOTE ADULT - PROBLEM SELECTOR PLAN 1
Transferred to [facility] for ENT evaluation for possible I & D of possible submandibular abscess/furuncle   CT soft tissue neck C+ (03/28/2025) demonstrated submandibular space superficial and deep infiltration suggestive of cellulitis with no abscess, asymmetric fullness left aryepiglottic fold and diffuse sclerotic metastases.   ESR: 70  Lactate: 3.40 --> 2.00  Procalcitonin: 1.14   CRP: 285  Blood cultures (03/28/25) NGTD x 4 days  Wound culture (03/29/25) growth with few Parvimonas micra and Peptostreptococcus species    C/w vanc/zosyn  ENT and ID consults appreciated

## 2025-04-01 NOTE — PROGRESS NOTE ADULT - PROBLEM SELECTOR PLAN 5
Hgb: 9.9 --> 8.0 --> 7.6 --> 7.1  Iron: 27   TIBC: 188.86  Ferritin: 344  B12: 2447   FA: 13.20  Complained of 1 month history of black stool   Evaluated by GI at [facility] who recommended EGD/Colonoscopy once submandibular cellulitis is treated and to continue Protonix BID   Continues to have black stool     Monitor H&H   Maintain active type and screen   Transfuse if Hgb<8 g/dL   Monitor for further active signs of bleeding   Protonix BID   GI consulted- endoscopy deferred for now, recommending outpt vs inpt when medically optimized  Pt currently declining blood transfusion

## 2025-04-01 NOTE — CONSULT NOTE ADULT - SUBJECTIVE AND OBJECTIVE BOX
Chief Complaint: dental pain      HPI:  Neva Harkins is a 80 yo F with PMH of metastatic breast cancer, CAD s/p PCI 2013, chronic anemia (on palbociclib) and HTN presenting to [facility] for submandibular pain and erythema for 1 week. She endorses worsening dental pain for 1 week with accompanied redness and swelling. She took tylenol at home for the pain. Due to severity of symptoms, she came to [place] for evaluation. On admission to [place], pt found to be in afib with rvr and had OMKAR and hyponatremia which improved with fluids. She was found to have a submandibular abscess and started on broad spectrum antibiotics then transferred to [facility] for drainage.    GI consulted for melena. Pt reports loose black stools for 1 week with ~3 episodes per day and had her last episode this afternoon. She denies any history of gi bleeds in the past and has never had an endoscopy or colonoscopy. Pt denies any nsaid, ac, or antiplatelet medication use. She denies any abdominal pain, n/v/d, hematemesis, or hematochezia. Labs on admission notable for wbc 5.83 Hgb 9.9 (baseline 8), .7, plt 228, Cr 0.96, BUN 15, and Na 129. Hgb fell on 3/30 to baseline of 8 after fluids then fell to 7.1 on 4.1.     Allergies:  Vicodin (Unknown)      Home Medications:  amLODIPine 5 mg oral tablet: 1 tab(s) orally once a day (01 Apr 2025 01:52)  apixaban 5 mg oral tablet: 1 tab(s) orally 2 times a day (01 Apr 2025 01:52)  atorvastatin 20 mg oral tablet: 1 tab(s) orally once a day (at bedtime) (01 Apr 2025 01:52)  cholecalciferol 25 mcg (1000 intl units) oral tablet: 1 tab(s) orally once a day (01 Apr 2025 01:53)  cyanocobalamin: 2,000 microgram(s) orally once a day (01 Apr 2025 01:52)  DULoxetine 30 mg oral delayed release capsule: 1 cap(s) orally once a day (at bedtime) (01 Apr 2025 01:52)  letrozole 2.5 mg oral tablet: 1 tab(s) orally once a day (01 Apr 2025 01:54)  metoprolol tartrate 25 mg oral tablet: 1 tab(s) orally every 6 hours (01 Apr 2025 01:54)  oxyCODONE 5 mg oral tablet: 1 tab(s) orally every 6 hours as needed for  severe pain (01 Apr 2025 01:54)  pantoprazole 40 mg intravenous injection: 40 unit(s) intravenously every 12 hours (01 Apr 2025 01:54)      Hospital Medications:  aluminum hydroxide/magnesium hydroxide/simethicone Suspension 30 milliLiter(s) Oral every 4 hours PRN  amLODIPine   Tablet 5 milliGRAM(s) Oral daily  atorvastatin 20 milliGRAM(s) Oral at bedtime  cholecalciferol 1000 Unit(s) Oral daily  cyanocobalamin 1000 MICROGram(s) Oral daily  DULoxetine 30 milliGRAM(s) Oral daily  letrozole 2.5 milliGRAM(s) Oral daily  melatonin 3 milliGRAM(s) Oral at bedtime PRN  metoprolol tartrate 25 milliGRAM(s) Oral every 6 hours  morphine  - Injectable 1 milliGRAM(s) IV Push once  ondansetron Injectable 4 milliGRAM(s) IV Push every 8 hours PRN  oxyCODONE    IR 5 milliGRAM(s) Oral every 6 hours PRN  pantoprazole  Injectable 40 milliGRAM(s) IV Push every 12 hours  piperacillin/tazobactam IVPB.. 4.5 Gram(s) IV Intermittent every 8 hours  potassium phosphate IVPB 30 milliMole(s) IV Intermittent once  vancomycin  IVPB 1000 milliGRAM(s) IV Intermittent every 24 hours      PMHX/PSHX:  Breast cancer    HTN (hypertension)    CAD (coronary artery disease)        Family history:      Denies family history of colon cancer/polyps, stomach cancer/polyps, pancreatic cancer/masses, liver cancer/disease, ovarian cancer and endometrial cancer.    Social History:     Tob: Denies  EtOH: As above  Illicit Drugs: Denies    ROS:   General:  No wt loss, fevers, chills, night sweats, fatigue  Eyes:  Good vision, no reported pain  ENT:  No sore throat, pain, runny nose, dysphagia  CV:  No pain, palpitations, hypo/hypertension  Pulm:  No dyspnea, cough, tachypnea, wheezing  GI:  As per HPI  :  No pain, bleeding, incontinence, nocturia  Muscle:  No pain, weakness  Neuro:  No weakness, tingling, memory problems  Psych:  No fatigue, insomnia, mood problems, depression  Endocrine:  No polyuria, polydipsia, cold/heat intolerance  Heme:  No petechiae, ecchymosis, easy bruisability  Skin:  No rash, tattoos, scars, edema    PHYSICAL EXAM:   GENERAL:  No acute distress  HEENT:  Normocephalic/atraumatic, no scleral icterus  CHEST:  No accessory muscle use  HEART:  Regular rate and rhythm  ABDOMEN:  Soft, non-tender, non-distended, normoactive bowel sounds,  no masses, no hepato-splenomegaly, no signs of chronic liver disease  EXTREMITIES: No cyanosis, clubbing, or edema  SKIN:  No rash  NEURO:  Alert and oriented x 3, no asterixis    Vital Signs:  Vital Signs Last 24 Hrs  T(C): 36.5 (01 Apr 2025 12:59), Max: 36.8 (31 Mar 2025 22:41)  T(F): 97.7 (01 Apr 2025 12:59), Max: 98.2 (31 Mar 2025 22:41)  HR: 75 (01 Apr 2025 12:59) (75 - 85)  BP: 117/58 (01 Apr 2025 12:59) (117/58 - 149/56)  BP(mean): --  RR: 18 (01 Apr 2025 12:59) (18 - 19)  SpO2: 100% (01 Apr 2025 12:59) (98% - 100%)    Parameters below as of 01 Apr 2025 12:59  Patient On (Oxygen Delivery Method): room air      Daily Height in cm: 157.48 (31 Mar 2025 22:41)    Daily     LABS:                        7.1    2.92  )-----------( 134      ( 01 Apr 2025 06:25 )             21.9     Mean Cell Volume: 118.4 fL (04-01-25 @ 06:25)    04-01    136  |  102  |  8   ----------------------------<  108[H]  3.8   |  18[L]  |  0.73    Ca    7.6[L]      01 Apr 2025 06:25  Phos  1.5     04-01  Mg     2.30     04-01    TPro  6.1  /  Alb  3.0[L]  /  TBili  0.3  /  DBili  x   /  AST  21  /  ALT  13  /  AlkPhos  84  04-01    LIVER FUNCTIONS - ( 01 Apr 2025 01:25 )  Alb: 3.0 g/dL / Pro: 6.1 g/dL / ALK PHOS: 84 U/L / ALT: 13 U/L / AST: 21 U/L / GGT: x           PT/INR - ( 01 Apr 2025 06:25 )   PT: 15.8 sec;   INR: 1.33 ratio         PTT - ( 01 Apr 2025 06:25 )  PTT:33.9 sec  Urinalysis Basic - ( 01 Apr 2025 06:25 )    Color: x / Appearance: x / SG: x / pH: x  Gluc: 108 mg/dL / Ketone: x  / Bili: x / Urobili: x   Blood: x / Protein: x / Nitrite: x   Leuk Esterase: x / RBC: x / WBC x   Sq Epi: x / Non Sq Epi: x / Bacteria: x                              7.1    2.92  )-----------( 134      ( 01 Apr 2025 06:25 )             21.9                         7.6    3.44  )-----------( 163      ( 01 Apr 2025 01:25 )             22.0

## 2025-04-01 NOTE — OCCUPATIONAL THERAPY INITIAL EVALUATION ADULT - PERTINENT HX OF CURRENT PROBLEM, REHAB EVAL
Pt is an 79 year old female who presented to [facility] complaining of 1 week of dyspnea along with several days of submandibular tenderness and erythema. Transferred here for possible surgical  intervention as she has a large submandibular abscess/furuncle of dental origin.

## 2025-04-01 NOTE — PHYSICAL THERAPY INITIAL EVALUATION ADULT - ADDITIONAL COMMENTS
Pt lives with her daughter/son-in-law in a house with 2 stairs to enter; Pt resides on the first floor. Prior to admission Pt was independent with all mobility and ambulated without an assistive device. Pt stated she typically ambulates short distances. Medical equipment: rolling walker     Pt. left comfortable in bed with all tubes/lines intact, head of the bed elevated, +bed alarm, call bell in reach and in NAD. RN, Mo, aware of session and pt current position.

## 2025-04-01 NOTE — CHART NOTE - NSCHARTNOTEFT_GEN_A_CORE
Informed the patient of her current hemoglobin level of 7.6.   Discussed in length regarding blood transfusion including complications. She stated understanding but refused to sign the consent form as she did not want to receive blood products from other people.   She requested time to think before making a decision to receive blood products. Informed the patient of her current hemoglobin level of 7.6.   Discussed in length regarding blood transfusion including complications. She stated understanding but refused to sign the consent form as she did not want to receive blood products from other people.   She requested time to reconsider before making a decision to receive blood products.

## 2025-04-01 NOTE — CONSULT NOTE ADULT - PROBLEM SELECTOR RECOMMENDATION 9
1. ID consult, IV abx as per primary team and ID  2. Pain control  3. Follow up Culture results from [facility]   4. Consider re imaging as well as opening wound further. Would have OMFS consult. Patient was told infection might be odontogenic   5. Packing to be placed when patient as IV pain order  6. Will discuss with ENT attending
Detail Level: Simple
Additional Notes: Patient reports similar eczema patches at her hands and arms that last for several weeks and are very itchy. She reports this new rash on her hand is associated with blisters and is very itchy. We discuss the chronic nature of this condition and have recommended prescription topical betamethasone. She has utilized triamcinolone 0.1 from urgent care without effect
Render Risk Assessment In Note?: no

## 2025-04-01 NOTE — OCCUPATIONAL THERAPY INITIAL EVALUATION ADULT - GENERAL OBSERVATIONS, REHAB EVAL
Patient found semi-reclined in bed, NAD, and able to follow directions. Vitals: HR 85 BPM. Patient agreeable to participate in skilled OT evaluation.

## 2025-04-01 NOTE — CONSULT NOTE ADULT - NS ATTEND AMEND GEN_ALL_CORE FT
as noted above   recommend ENT and ID consults  hold palbociclib at this time   note pt on monthly xgeva, mouth exam did not note any osteonecrosis, appreciate ENT help
1. ID consult, IV abx as per primary team and ID  2. Pain control  3. Follow up Culture results from [facility]   4. Consider re imaging as well as opening wound further. Would have OMFS consult. Patient was told infection might be odontogenic   5. Packing to be placed when patient as IV pain order  6. Patient high risk for necrotizing fascitis  7.Further I &D may be needed / OMFS  8. ENT will follow up    Agree with above assessment and plan  Thank you for your referral  Feliciano Guerra MD

## 2025-04-01 NOTE — PROGRESS NOTE ADULT - PROBLEM SELECTOR PLAN 4
Home medications: Amlodipine 5 mg daily     Amlodipine 5 mg daily   Monitor BP and adjust accordingly

## 2025-04-01 NOTE — CONSULT NOTE ADULT - ASSESSMENT
This is an 79 year old female with metastatic breast cancer who was transferred here for evaluation of submandibuar wound.    1. Metastatic breast cancer   -- Diagnosed in 2021 via biopsy of iliac bone confirming metastatic breast cancer   -- She has been on palbociclib and letrozole since. Hold palbociclib for now, may continue letrozole inpatient   -- Follow up with Dr. Bam Cunha of [facility] after discharge.    2. Cellulitis   -- Pt transferred from [facility] for evaluation and treatment of cellulitis of submandibular region w possible abscess   -- Awaiting ENT and ID evaluation  -- On IV Zosyn and vancomycin   -- Pt on monthly Xgeva, last dose 01/29/2025     3. Anemia   -- Baseline hemoglobin outpatient 9-10   -- Iron studies w/o evidence of iron deficiency   -- May be related to palbociclib though pt has been on this since 2021. Possibly worsened by acute infection   -- GI consult as pt reports melena   -- Monitor CBC and transfuse to maintain hg >7    Will continue to follow.    Mattie Wesley PA-C  Hematology/Oncology  [facility]  [phone] (office)  [phone] (alt office)

## 2025-04-01 NOTE — CONSULT NOTE ADULT - SUBJECTIVE AND OBJECTIVE BOX
CC: submandibular cellulitis     HPI: 79 year old old female with a history of metastatic breast cancer, HTN, CAD and afib that presents to [facility] via transfer from [facility] after being admitted for submandibular swelling and pain. Patient states that ID was following her and the wound started draining spontaneously. Patient was also told the abscess might be secondary to dental infection. transferred for ENT eval at [facility]. Denies any fever, chills, sore throat, dyspnea, dysphagia. No other complaints.        PAST MEDICAL & SURGICAL HISTORY:  Breast cancer      HTN (hypertension)      CAD (coronary artery disease)        Allergies    Vicodin (Unknown)    Intolerances      MEDICATIONS  (STANDING):  amLODIPine   Tablet 5 milliGRAM(s) Oral daily  atorvastatin 20 milliGRAM(s) Oral at bedtime  cholecalciferol 1000 Unit(s) Oral daily  cyanocobalamin 1000 MICROGram(s) Oral daily  DULoxetine 30 milliGRAM(s) Oral daily  letrozole 2.5 milliGRAM(s) Oral daily  metoprolol tartrate 25 milliGRAM(s) Oral every 6 hours  morphine  - Injectable 1 milliGRAM(s) IV Push once  pantoprazole  Injectable 40 milliGRAM(s) IV Push every 12 hours  piperacillin/tazobactam IVPB.. 4.5 Gram(s) IV Intermittent every 8 hours  potassium phosphate IVPB 30 milliMole(s) IV Intermittent once  vancomycin  IVPB 1000 milliGRAM(s) IV Intermittent every 24 hours    MEDICATIONS  (PRN):  aluminum hydroxide/magnesium hydroxide/simethicone Suspension 30 milliLiter(s) Oral every 4 hours PRN Dyspepsia  melatonin 3 milliGRAM(s) Oral at bedtime PRN Insomnia  ondansetron Injectable 4 milliGRAM(s) IV Push every 8 hours PRN Nausea and/or Vomiting  oxyCODONE    IR 5 milliGRAM(s) Oral every 6 hours PRN for severe pain    ROS:   ENT: all negative except as noted in HPI   CV: denies palpitations  Pulm: denies SOB, cough, hemoptysis  GI: denies change in apetite, indigestion, n/v  : denies pertinent urinary symptoms, urgency  Neuro: denies numbness/tingling, loss of sensation  Psych: denies anxiety  MS: denies muscle weakness, instability  Heme: denies easy bruising or bleeding  Endo: denies heat/cold intolerance, excessive sweating  Vascular: denies LE edema    Vital Signs Last 24 Hrs  T(C): 36.5 (01 Apr 2025 12:59), Max: 36.8 (31 Mar 2025 22:41)  T(F): 97.7 (01 Apr 2025 12:59), Max: 98.2 (31 Mar 2025 22:41)  HR: 75 (01 Apr 2025 12:59) (75 - 85)  BP: 117/58 (01 Apr 2025 12:59) (117/58 - 149/56)  BP(mean): --  RR: 18 (01 Apr 2025 12:59) (18 - 19)  SpO2: 100% (01 Apr 2025 12:59) (98% - 100%)    Parameters below as of 01 Apr 2025 12:59  Patient On (Oxygen Delivery Method): room air                              7.1    2.92  )-----------( 134      ( 01 Apr 2025 06:25 )             21.9    04-01    136  |  102  |  8   ----------------------------<  108[H]  3.8   |  18[L]  |  0.73    Ca    7.6[L]      01 Apr 2025 06:25  Phos  1.5     04-01  Mg     2.30     04-01    TPro  6.1  /  Alb  3.0[L]  /  TBili  0.3  /  DBili  x   /  AST  21  /  ALT  13  /  AlkPhos  84  04-01   PT/INR - ( 01 Apr 2025 06:25 )   PT: 15.8 sec;   INR: 1.33 ratio         PTT - ( 01 Apr 2025 06:25 )  PTT:33.9 sec    PHYSICAL EXAM:  Gen: NAD  Skin: No rashes, bruises, or lesions  Head: Normocephalic, Atraumatic, + submandibular swelling with tenderness to palpation. Actively draining pus. + fluctuance.   Face: no edema, erythema, or fluctuance. Parotid glands soft without mass  Eyes: no scleral injection  Nose: Nares bilaterally patent, no discharge  Mouth: No Stridor / Drooling / Trismus.  Mucosa moist, tongue/uvula midline, oropharynx clear  Neck: no lymphadenopathy, trachea midline, no masses  Lymphatic: No lymphadenopathy  Resp: breathing easily, no stridor  CV: no peripheral edema/cyanosis  GI: nondistended   Peripheral vascular: no JVD or edema  Neuro: facial nerve intact, no facial droop      IMAGING/ADDITIONAL STUDIES:   IMPRESSION:    1. Submandibular space superficial and deep infiltration suggests an active  inflammatory process such as cellulitis. No abscess is recognized    2. Asymmetric fullness left aryepiglottic fold, nonspecific    3. Diffuse sclerotic metastases

## 2025-04-01 NOTE — CONSULT NOTE ADULT - ASSESSMENT
80 yo F with PMH of metastatic breast cancer, CAD s/p PCI 2013, chronic anemia (on palbociclib) and HTN transferred from [facility] for submandibular abscess. GI consulted for melena and acute on chronic anemia.     Impression:  #Melena  #Acute on chronic anemia    P/w loose black stools for 1 wee. She denies any history of gi bleeds in the past and has never had an endoscopy or colonoscopy. Labs on admission notable for wbc 5.83, Hgb 9.9 (baseline 8), .7, plt 228, Cr 0.96, BUN 15, and Na 129. Hgb fell on 3/30 to baseline of 8 after fluids then fell to 7.1 on 4.1. Suspect initial labs with Hgb 9.9 likely hemoconcentrated given accompanied hyponatremia and OMKAR which improved with hydration. Initial drop in Hgb to 8 likely dilutional. Hgb fell further to 7.1 slightly below baseline with no accompanied azotemia making brisk bleed unlikely. Chronic macrocyctic anemia likely due to palbociclib. Melena suggestive of GI blood loss and ddx includes PUD, gastritis, duodenitis, esophagitis, and AVMs. Pt hemodynamically stable    - Defer endoscopic evaluation at this time given active treatment for submandibular infection  - Endoscopy can be performed as outpatient or as inpatient when optimized  - C/w PPI IV q12h  - Trend cbc, active T&S, transfuse Hgb>7  - Hematology recs for anemia  - Avoid nsaid medications  - Monitor for overt signs of bleeding    We will sign off at this time, however please call back with questions or should any worsening/persistent issues arise.     All recommendations are tentative until note is attested by attending.     Lisy Fernando, PGY4  Gastroenterology/Hepatology Fellow  Available on [employer] Teams  [phone] (Long Range Pager)  [phone] (Short Range Pager [facility])    After 5 pm, please contact the on-call GI fellow for any urgent issues via the Hospital Call

## 2025-04-01 NOTE — PROGRESS NOTE ADULT - PROBLEM SELECTOR PLAN 2
EKG (03/29/25) demonstrated AF with RVR - now resolved  pro-BNP: 3042  TSH: 0.690  Evaluated by Cardiology who recommended to continue Eliquis   CHADsVASc score of 3    Per risks vs benefits discussion with pt and family at bedside given melena and new AF, informed decision made to discontinue eliquis at this time

## 2025-04-02 ENCOUNTER — RESULT REVIEW (OUTPATIENT)
Age: 80
End: 2025-04-02

## 2025-04-02 ENCOUNTER — APPOINTMENT (OUTPATIENT)
Age: 80
End: 2025-04-02

## 2025-04-02 LAB
ADD ON TEST-SPECIMEN IN LAB: SIGNIFICANT CHANGE UP
ADD ON TEST-SPECIMEN IN LAB: SIGNIFICANT CHANGE UP
ANION GAP SERPL CALC-SCNC: 15 MMOL/L — HIGH (ref 7–14)
BASOPHILS # BLD AUTO: 0.02 K/UL — SIGNIFICANT CHANGE UP (ref 0–0.2)
BASOPHILS NFR BLD AUTO: 0.8 % — SIGNIFICANT CHANGE UP (ref 0–2)
BUN SERPL-MCNC: 4 MG/DL — LOW (ref 7–23)
CALCIUM SERPL-MCNC: 7.7 MG/DL — LOW (ref 8.4–10.5)
CHLORIDE SERPL-SCNC: 104 MMOL/L — SIGNIFICANT CHANGE UP (ref 98–107)
CO2 SERPL-SCNC: 15 MMOL/L — LOW (ref 22–31)
CREAT SERPL-MCNC: 0.64 MG/DL — SIGNIFICANT CHANGE UP (ref 0.5–1.3)
EGFR: 89 ML/MIN/1.73M2 — SIGNIFICANT CHANGE UP
EGFR: 89 ML/MIN/1.73M2 — SIGNIFICANT CHANGE UP
EOSINOPHIL # BLD AUTO: 0.02 K/UL — SIGNIFICANT CHANGE UP (ref 0–0.5)
EOSINOPHIL NFR BLD AUTO: 0.8 % — SIGNIFICANT CHANGE UP (ref 0–6)
GI PCR PANEL: SIGNIFICANT CHANGE UP
GLUCOSE SERPL-MCNC: 104 MG/DL — HIGH (ref 70–99)
HCT VFR BLD CALC: 23.1 % — LOW (ref 34.5–45)
HGB BLD-MCNC: 7.8 G/DL — LOW (ref 11.5–15.5)
IANC: 1.24 K/UL — LOW (ref 1.8–7.4)
IMM GRANULOCYTES NFR BLD AUTO: 1.6 % — HIGH (ref 0–0.9)
LYMPHOCYTES # BLD AUTO: 0.78 K/UL — LOW (ref 1–3.3)
LYMPHOCYTES # BLD AUTO: 31.8 % — SIGNIFICANT CHANGE UP (ref 13–44)
MAGNESIUM SERPL-MCNC: 2.1 MG/DL — SIGNIFICANT CHANGE UP (ref 1.6–2.6)
MCHC RBC-ENTMCNC: 33.8 G/DL — SIGNIFICANT CHANGE UP (ref 32–36)
MCHC RBC-ENTMCNC: 38.8 PG — HIGH (ref 27–34)
MCV RBC AUTO: 114.9 FL — HIGH (ref 80–100)
MONOCYTES # BLD AUTO: 0.35 K/UL — SIGNIFICANT CHANGE UP (ref 0–0.9)
MONOCYTES NFR BLD AUTO: 14.3 % — HIGH (ref 2–14)
NEUTROPHILS # BLD AUTO: 1.24 K/UL — LOW (ref 1.8–7.4)
NEUTROPHILS NFR BLD AUTO: 50.7 % — SIGNIFICANT CHANGE UP (ref 43–77)
NRBC # BLD AUTO: 0 K/UL — SIGNIFICANT CHANGE UP (ref 0–0)
NRBC # FLD: 0 K/UL — SIGNIFICANT CHANGE UP (ref 0–0)
NRBC BLD AUTO-RTO: 0 /100 WBCS — SIGNIFICANT CHANGE UP (ref 0–0)
OB PNL STL: POSITIVE
PHOSPHATE SERPL-MCNC: 2.4 MG/DL — LOW (ref 2.5–4.5)
PLATELET # BLD AUTO: 144 K/UL — LOW (ref 150–400)
POTASSIUM SERPL-MCNC: 4.4 MMOL/L — SIGNIFICANT CHANGE UP (ref 3.5–5.3)
POTASSIUM SERPL-SCNC: 4.4 MMOL/L — SIGNIFICANT CHANGE UP (ref 3.5–5.3)
RBC # BLD: 2.01 M/UL — LOW (ref 3.8–5.2)
RBC # FLD: 13.6 % — SIGNIFICANT CHANGE UP (ref 10.3–14.5)
SODIUM SERPL-SCNC: 134 MMOL/L — LOW (ref 135–145)
WBC # BLD: 2.5 K/UL — LOW (ref 3.8–10.5)
WBC # FLD AUTO: 2.5 K/UL — LOW (ref 3.8–10.5)

## 2025-04-02 PROCEDURE — 99222 1ST HOSP IP/OBS MODERATE 55: CPT | Mod: GC

## 2025-04-02 PROCEDURE — G0545: CPT

## 2025-04-02 PROCEDURE — 99233 SBSQ HOSP IP/OBS HIGH 50: CPT

## 2025-04-02 PROCEDURE — 99233 SBSQ HOSP IP/OBS HIGH 50: CPT | Mod: FS

## 2025-04-02 RX ORDER — AMPICILLIN SODIUM AND SULBACTAM SODIUM 1; .5 G/1; G/1
3 INJECTION, POWDER, FOR SOLUTION INTRAMUSCULAR; INTRAVENOUS EVERY 6 HOURS
Refills: 0 | Status: DISCONTINUED | OUTPATIENT
Start: 2025-04-02 | End: 2025-04-04

## 2025-04-02 RX ORDER — SOD PHOS DI, MONO/K PHOS MONO 250 MG
1 TABLET ORAL EVERY 4 HOURS
Refills: 0 | Status: COMPLETED | OUTPATIENT
Start: 2025-04-02 | End: 2025-04-02

## 2025-04-02 RX ADMIN — METOPROLOL SUCCINATE 25 MILLIGRAM(S): 50 TABLET, EXTENDED RELEASE ORAL at 12:56

## 2025-04-02 RX ADMIN — Medication 40 MILLIGRAM(S): at 18:40

## 2025-04-02 RX ADMIN — METOPROLOL SUCCINATE 25 MILLIGRAM(S): 50 TABLET, EXTENDED RELEASE ORAL at 18:40

## 2025-04-02 RX ADMIN — OXYCODONE HYDROCHLORIDE 5 MILLIGRAM(S): 30 TABLET ORAL at 19:42

## 2025-04-02 RX ADMIN — CYANOCOBALAMIN 1000 MICROGRAM(S): 1000 INJECTION INTRAMUSCULAR; SUBCUTANEOUS at 12:57

## 2025-04-02 RX ADMIN — Medication 1000 UNIT(S): at 12:56

## 2025-04-02 RX ADMIN — LETROZOLE 2.5 MILLIGRAM(S): 2.5 TABLET, FILM COATED ORAL at 12:57

## 2025-04-02 RX ADMIN — Medication 1 PACKET(S): at 18:38

## 2025-04-02 RX ADMIN — ATORVASTATIN CALCIUM 20 MILLIGRAM(S): 80 TABLET, FILM COATED ORAL at 21:33

## 2025-04-02 RX ADMIN — OXYCODONE HYDROCHLORIDE 5 MILLIGRAM(S): 30 TABLET ORAL at 20:40

## 2025-04-02 RX ADMIN — AMPICILLIN SODIUM AND SULBACTAM SODIUM 200 GRAM(S): 1; .5 INJECTION, POWDER, FOR SOLUTION INTRAMUSCULAR; INTRAVENOUS at 18:39

## 2025-04-02 RX ADMIN — Medication 1 PACKET(S): at 12:54

## 2025-04-02 RX ADMIN — DULOXETINE 30 MILLIGRAM(S): 20 CAPSULE, DELAYED RELEASE ORAL at 12:56

## 2025-04-02 RX ADMIN — Medication 25 GRAM(S): at 06:10

## 2025-04-02 RX ADMIN — Medication 40 MILLIGRAM(S): at 06:10

## 2025-04-02 NOTE — PROGRESS NOTE ADULT - PROBLEM SELECTOR PLAN 1
- No surgical intervention from ENT perspective at this time  - OMFS recs appreciated  - Please get ID consult for abx duration and choice  - Can consider wound care  - Can cover the left submandibular region with guaze and tape   - Case discussed with Dr. Guerra - No surgical intervention from ENT perspective at this time  - OMFS recs appreciated  - Please get ID consult for abx duration and choice  - Can consider wound care  - Can cover the left submandibular region with guaze and tape   - Case discussed with Dr. Guerra  - Will discuss the case with H&N Dr. Jacob - OMFS recs appreciated  - Please get ID consult for abx duration and choice  - Can consider wound care  - Can cover the left submandibular region with guaze and tape   - Case discussed with Dr. Guerra  - Will discuss the case with H&N Dr. Jacob for possible I&D

## 2025-04-02 NOTE — CONSULT NOTE ADULT - REASON FOR ADMISSION
Submandibular cellulitis

## 2025-04-02 NOTE — PROGRESS NOTE ADULT - ASSESSMENT
This is an 79 year old female with metastatic breast cancer who was transferred here for evaluation of submandibuar wound.    1. Metastatic breast cancer   -- Diagnosed in 2021 via biopsy of iliac bone confirming metastatic breast cancer   -- She has been on palbociclib and letrozole since. Hold palbociclib for now, may continue letrozole inpatient   -- Follow up with Dr. Bam Cunha of [facility] after discharge.    2. Cellulitis   -- Pt transferred from [facility] for evaluation and treatment of cellulitis of submandibular region w possible abscess   -- ENT consulted, defer management to OMFS  -- OMFS with tentative plan for I&D and debridement, f/u final plan  -- ID following, rec IV Unasyn  -- Pt on monthly Xgeva, last dose 01/29/2025     3. Anemia   -- Baseline hemoglobin outpatient 9-10   -- Iron studies w/o evidence of iron deficiency   -- May be related to palbociclib though pt has been on this since 2021. Likely worsened in setting of GIB and acute infection  -- GI consulted, however pt and family deferring endoscopic evaluation until after submandibular wound is taken care of   -- Reports brown stool last night   -- Monitor CBC and transfuse to maintain hg >7    Will continue to follow.    Mattie Wesley PA-C  Hematology/Oncology  [facility]  [phone] (office)  [phone] (alt office)

## 2025-04-02 NOTE — CONSULT NOTE ADULT - SUBJECTIVE AND OBJECTIVE BOX
Patient is a 79y old  Female who presents with a chief complaint of Submandibular cellulitis (02 Apr 2025 08:59)    HPI:  78 y/o F with PMHx of metastatic breast cancer (follows with [facility]), HTN, CAD (s/p 1 stent in 2013) presented to the ED at [facility] complaining of 1 week of dyspnea along with several days of submandibular tenderness and erythema. She stated that she has been experiencing dyspnea at rest for 1 week with worsening dyspnea on exertion. She also has been experiencing dental pain and increasing tenderness in the submandibular region with overlying erythema. Dental pain was 5/10 and well controlled with Tylenol at home. She denied fevers/chills or recent dental procedure. She also has occasional leg swelling for which she was briefly taking Lasix for but stopped due to low sodium. In [place] ED patient was noted to be in AF with RVR and received Lopressor which slowed down her HR and eventually patient spontaneously went back into NSR. She was evaluated by Cardiology at [place] who recommended to monitor and obtain TTE. She also had an OMKAR and hyponatremia for which she was treated with gentle IVF. She was also evaluated by GI at [place] due to 1 month history of black stool and noted to be anemic during admission. She was started on Protonix BID and GI recommended eventual EGD and Colonoscopy to rule out PUD and neoplasia but that submandibular infection should be evaluated first. She was also started on vancomycin and Zosyn for management of submandibular cellulitis. She was noted to have an open wound in the submandibular region with several small openings with purulent drainage. ID was consulted and recommended transfer to tertiary facility for possible surgical intervention as she has a large submandibular abscess/furuncle of dental origin and to continue Zosyn and vancomycin.   Case was discussed with [facility] and ENT (Dr. Dalal) will evaluate the patient. She was discharged from [facility] on 03/31/2025.      (31 Mar 2025 23:46)       REVIEW OF SYSTEMS  Constitutional: No fevers, chills, weight loss or fatigue   Skin: No rash, no phlebitis	  Eyes: No discharge	  ENMT: +submandibular swelling  Respiratory: No cough, no SOB  Cardiovascular:  No chest pain, palpitations or edema   Gastrointestinal: No pain, nausea, vomiting, diarrhea or constipation	  Genitourinary: No dysuria, discharge or flank pain  MSK: No arthralgias or back pain   Neurological: No HA, no weakness, no seizures, no AMS       prior hospital charts reviewed [V]  primary team notes reviewed [V]  other consultant notes reviewed [V]    PAST MEDICAL & SURGICAL HISTORY:  Breast cancer      HTN (hypertension)      CAD (coronary artery disease)      SOCIAL HISTORY:  - Lives with daughter and son in law    FAMILY HISTORY:      Allergies  Vicodin (Unknown)    ANTIMICROBIALS:  piperacillin/tazobactam IVPB.. 4.5 every 8 hours  vancomycin  IVPB 1000 every 24 hours      ANTIMICROBIALS (past 90 days):  MEDICATIONS  (STANDING):  piperacillin/tazobactam IVPB..   25 mL/Hr IV Intermittent (04-02-25 @ 06:10)   25 mL/Hr IV Intermittent (04-01-25 @ 21:12)   25 mL/Hr IV Intermittent (04-01-25 @ 13:50)   25 mL/Hr IV Intermittent (04-01-25 @ 04:35)    vancomycin  IVPB   250 mL/Hr IV Intermittent (04-01-25 @ 16:04)    OTHER MEDS:   MEDICATIONS  (STANDING):  aluminum hydroxide/magnesium hydroxide/simethicone Suspension 30 every 4 hours PRN  amLODIPine   Tablet 5 daily  atorvastatin 20 at bedtime  DULoxetine 30 daily  letrozole 2.5 daily  melatonin 3 at bedtime PRN  metoprolol tartrate 25 every 6 hours  morphine  - Injectable 1 once  ondansetron Injectable 4 every 8 hours PRN  oxyCODONE    IR 5 every 6 hours PRN  pantoprazole  Injectable 40 every 12 hours      VITALS:  Vital Signs Last 24 Hrs  T(F): 98.7 (04-02-25 @ 05:02), Max: 98.7 (04-02-25 @ 05:02)    Vital Signs Last 24 Hrs  HR: 92 (04-02-25 @ 05:02) (72 - 92)  BP: 151/53 (04-02-25 @ 05:02) (103/55 - 151/53)  RR: 18 (04-02-25 @ 05:02)  SpO2: 99% (04-02-25 @ 05:02) (98% - 100%)  Wt(kg): --    EXAM:  General: Patient in no acute distress   HEENT: Indurated, erythematous submandibular area with purulent drainage. No oral lesions  CV: S1+S2, no m/r/g appreciated   Lungs: No respiratory distress, CTAB  Abd: Soft, nontender  Neuro: Alert and oriented      Labs:                        7.8    2.50  )-----------( 144      ( 02 Apr 2025 06:30 )             23.1     04-02    134[L]  |  104  |  4[L]  ----------------------------<  104[H]  4.4   |  15[L]  |  0.64    Ca    7.7[L]      02 Apr 2025 06:30  Phos  2.4     04-02  Mg     2.10     04-02    TPro  6.1  /  Alb  3.0[L]  /  TBili  0.3  /  DBili  x   /  AST  21  /  ALT  13  /  AlkPhos  84  04-01      WBC Trend:  WBC Count: 2.50 (04-02-25 @ 06:30)  WBC Count: 2.92 (04-01-25 @ 06:25)  WBC Count: 3.44 (04-01-25 @ 01:25)    Creatine Trend:  Creatinine: 0.64 (04-02)  Creatinine: 0.73 (04-01)  Creatinine: 0.77 (04-01)      Liver Biochemical Testing Trend:  Alanine Aminotransferase (ALT/SGPT): 13 (04-01)  Aspartate Aminotransferase (AST/SGOT): 21 (04-01-25 @ 01:25)  Bilirubin Total: 0.3 (04-01)    Urinalysis Basic - ( 02 Apr 2025 06:30 )    Color: x / Appearance: x / SG: x / pH: x  Gluc: 104 mg/dL / Ketone: x  / Bili: x / Urobili: x   Blood: x / Protein: x / Nitrite: x   Leuk Esterase: x / RBC: x / WBC x   Sq Epi: x / Non Sq Epi: x / Bacteria: x      MICROBIOLOGY:    Ferritin: 565 (04-01)    RADIOLOGY: In [facility]

## 2025-04-02 NOTE — PROGRESS NOTE ADULT - PROBLEM SELECTOR PLAN 4
Home medications: Amlodipine 5 mg daily     Amlodipine 5 mg daily   Monitor BP and adjust accordingly -Continue home amlodipine 5mg qd

## 2025-04-02 NOTE — CONSULT NOTE ADULT - ASSESSMENT
79 year old old female with a history of metastatic breast cancer, h/o Xgeva use since 2021, HTN, CAD and Afib  presents to [facility] with stage III MRONJ of submental region.    -tentative plan for I/D and debridement bedside  -rec PICC line  -continue IV abx  -Multimodal pain management  -Final plan pending discussion with attendingChristian Stephenson  Oral & Maxillofacial Surgery   #[id number]  Available on Teams      79 year old old female with a history of metastatic breast cancer, h/o Xgeva use since 2021, HTN, CAD and Afib  presents to [facility] with stage III MRONJ of submental region.    -tentative plan for I&D and debridement bedside  -will need daily dressing changes to chin  -rec ID consult for long term abx/PICC line  -continue IV abx  -Multimodal pain management  -Final plan pending discussion with attending'      Greta Stephenson  Oral & Maxillofacial Surgery   #[id number]  Available on Teams

## 2025-04-02 NOTE — PROGRESS NOTE ADULT - PROBLEM SELECTOR PLAN 2
EKG (03/29/25) demonstrated AF with RVR - now resolved  pro-BNP: 3042  TSH: 0.690  Evaluated by Cardiology who recommended to continue Eliquis   CHADsVASc score of 3    Per risks vs benefits discussion with pt and family at bedside given melena and new AF, informed decision made to discontinue eliquis at this time -Pt with new AF with RVR at [facility], now resolved  -TSH WNL  -Evaluated by Cardiology who recommended to continue Eliquis   -CHADsVASc score of 3    -Per risks vs benefits discussion with pt and family at bedside given melena, anemia, and new AF, informed decision made to discontinue eliquis at this time

## 2025-04-02 NOTE — CONSULT NOTE ADULT - ASSESSMENT
80 y/o F with PMHx of metastatic breast cancer (follows with [facility]), HTN, CAD (s/p 1 stent in 2013) presented to the ED at [facility] complaining of 1 week of dyspnea along with several days of submandibular tenderness and erythema. CT neck on 3/28 identified "submandibular space superficial and deep infiltration suggests an active inflammatory process such as cellulitis. No abscess is recognized." She was transferred for ENT evaluation. CTA chest did not show pneumonia or PE.    Here patient is afebrile with stable vital signs. Labs significant for pancytopenia, normal renal function. Blood cultures x2 on 3/28 are without growth. MRSA PCR on 3/29 negative. Wound culture from the submandibular area on 3/29 grew few Parvimonas micra/Peptostreptococcus. As per ENT, no surgical intervention is indicated. She was transferred on vancomycin and pip-tazo.    She has an indurated, erythematous submandibular area with purulent drainage that needs drainage.    #Submandibular abscess  #Metastatic breast cancer on palbociclib and letrozole, monthly Xgeva  #Positive wound culture growth   80 y/o F with PMHx of metastatic breast cancer (follows with [facility]), HTN, CAD (s/p 1 stent in 2013) presented to the ED at [facility] complaining of 1 week of dyspnea along with several days of submandibular tenderness and erythema. CT neck on 3/28 identified "submandibular space superficial and deep infiltration suggests an active inflammatory process such as cellulitis. No abscess is recognized." She was transferred for ENT evaluation. CTA chest did not show pneumonia or PE.    Here patient is afebrile with stable vital signs. Labs significant for pancytopenia, normal renal function. Blood cultures x2 on 3/28 are without growth. MRSA PCR on 3/29 negative. Wound culture from the submandibular area on 3/29 grew few Parvimonas micra/Peptostreptococcus. As per ENT, no surgical intervention is indicated. She was transferred on vancomycin and pip-tazo.    She has an indurated, erythematous submandibular area with purulent drainage that needs drainage.    #Submandibular abscess  #Metastatic breast cancer on palbociclib and letrozole, monthly Xgeva  #Positive wound culture growth    - would change zosyn to Unasyn 3g q6h  - discontinue vancomycin  - OMFS follow-up for drainage/dental eval  - monitor CBC with diff    d/w attending.    Dilan Conn, PGY5  ID Fellow  [employer] Teams Preferred  After 5pm/weekends call [phone]

## 2025-04-02 NOTE — PROGRESS NOTE ADULT - SUBJECTIVE AND OBJECTIVE BOX
ENT ISSUE/POD: Left submandibular cellulitis    HPI: patient seen and examined at bedside this morning. Reports feeling better, no acute complaints or events overnight.        PAST MEDICAL & SURGICAL HISTORY:  Breast cancer      HTN (hypertension)      CAD (coronary artery disease)        Allergies    Vicodin (Unknown)    Intolerances      MEDICATIONS  (STANDING):  amLODIPine   Tablet 5 milliGRAM(s) Oral daily  atorvastatin 20 milliGRAM(s) Oral at bedtime  cholecalciferol 1000 Unit(s) Oral daily  cyanocobalamin 1000 MICROGram(s) Oral daily  DULoxetine 30 milliGRAM(s) Oral daily  letrozole 2.5 milliGRAM(s) Oral daily  metoprolol tartrate 25 milliGRAM(s) Oral every 6 hours  morphine  - Injectable 1 milliGRAM(s) IV Push once  pantoprazole  Injectable 40 milliGRAM(s) IV Push every 12 hours  piperacillin/tazobactam IVPB.. 4.5 Gram(s) IV Intermittent every 8 hours  potassium phosphate / sodium phosphate Powder (PHOS-NaK) 1 Packet(s) Oral every 4 hours  vancomycin  IVPB 1000 milliGRAM(s) IV Intermittent every 24 hours    MEDICATIONS  (PRN):  aluminum hydroxide/magnesium hydroxide/simethicone Suspension 30 milliLiter(s) Oral every 4 hours PRN Dyspepsia  melatonin 3 milliGRAM(s) Oral at bedtime PRN Insomnia  ondansetron Injectable 4 milliGRAM(s) IV Push every 8 hours PRN Nausea and/or Vomiting  oxyCODONE    IR 5 milliGRAM(s) Oral every 6 hours PRN for severe pain      Social History: see consult note    Family history: see consult note    ROS:   ENT: all negative except as noted in HPI   Pulm: denies SOB, cough, hemoptysis  Neuro: denies numbness/tingling, loss of sensation  Endo: denies heat/cold intolerance, excessive sweating      Vital Signs Last 24 Hrs  T(C): 37.1 (02 Apr 2025 05:02), Max: 37.1 (02 Apr 2025 05:02)  T(F): 98.7 (02 Apr 2025 05:02), Max: 98.7 (02 Apr 2025 05:02)  HR: 92 (02 Apr 2025 05:02) (72 - 92)  BP: 151/53 (02 Apr 2025 05:02) (103/55 - 151/53)  BP(mean): --  RR: 18 (02 Apr 2025 05:02) (17 - 19)  SpO2: 99% (02 Apr 2025 05:02) (98% - 100%)    Parameters below as of 02 Apr 2025 05:02  Patient On (Oxygen Delivery Method): room air                              7.8    2.50  )-----------( 144      ( 02 Apr 2025 06:30 )             23.1    04-02    134[L]  |  104  |  4[L]  ----------------------------<  104[H]  4.4   |  15[L]  |  0.64    Ca    7.7[L]      02 Apr 2025 06:30  Phos  2.4     04-02  Mg     2.10     04-02    TPro  6.1  /  Alb  3.0[L]  /  TBili  0.3  /  DBili  x   /  AST  21  /  ALT  13  /  AlkPhos  84  04-01   PT/INR - ( 01 Apr 2025 06:25 )   PT: 15.8 sec;   INR: 1.33 ratio         PTT - ( 01 Apr 2025 06:25 )  PTT:33.9 sec    PHYSICAL EXAM:  Gen: NAD  Skin: No rashes, bruises, or lesions  Head: Normocephalic, Atraumatic  Face: + Eschar with active drainage noted on the left submandibular region, with mild surrounding tissue edema and erythema. Parotid glands soft without mass  Eyes: no scleral injection  Ears: no evidence of any fluid drainage. No mastoid tenderness, erythema, or ear bulging  Nose: Nares bilaterally patent, no discharge  Mouth: No Stridor / Drooling / Trismus.  Mucosa moist, tongue/uvula midline, oropharynx clear  Neck: Flat, supple, no lymphadenopathy, trachea midline, no masses  Lymphatic: No lymphadenopathy  Resp: breathing easily, no stridor  Neuro: facial nerve intact, no facial droop

## 2025-04-02 NOTE — PROGRESS NOTE ADULT - ASSESSMENT
78 y/o F with PMHx of metastatic breast cancer (follows with [facility]), HTN, CAD (s/p 1 stent in 2013) presented to the ED at [facility] complaining of 1 week of dyspnea along with several days of submandibular tenderness and erythema. Transfered here for possible surgical  intervention as she has a large submandibular abscess/furuncle of dental origin and to continue Zosyn and vancomycin. Wound culture (03/29/25) demonstrated growth with few Parvimonas micra and Peptostreptococcus species. Exam at bedside today shows spontaneous drainage of abscess at the left submandibular region on the skin. Afebrile, WBC 2.5

## 2025-04-02 NOTE — PROGRESS NOTE ADULT - SUBJECTIVE AND OBJECTIVE BOX
PROGRESS NOTE:   Authored by Annika Gayle Ma, MD  Available on MS Teams; Pager [phone]    Patient is a 79y old  Female who presents with a chief complaint of Submandibular cellulitis (02 Apr 2025 15:23)      SUBJECTIVE / OVERNIGHT EVENTS: Pt seen and examined with family members at bedside today. ANASTASIAEON. States the melena/dark stool resolved, had a normal brown stool. No new or acute symptoms. Awaiting I&D    All other review of systems is negative unless indicated above.    MEDICATIONS  (STANDING):  amLODIPine   Tablet 5 milliGRAM(s) Oral daily  ampicillin/sulbactam  IVPB 3 Gram(s) IV Intermittent every 6 hours  atorvastatin 20 milliGRAM(s) Oral at bedtime  cholecalciferol 1000 Unit(s) Oral daily  cyanocobalamin 1000 MICROGram(s) Oral daily  DULoxetine 30 milliGRAM(s) Oral daily  letrozole 2.5 milliGRAM(s) Oral daily  metoprolol tartrate 25 milliGRAM(s) Oral every 6 hours  morphine  - Injectable 1 milliGRAM(s) IV Push once  pantoprazole  Injectable 40 milliGRAM(s) IV Push every 12 hours  potassium phosphate / sodium phosphate Powder (PHOS-NaK) 1 Packet(s) Oral every 4 hours    MEDICATIONS  (PRN):  aluminum hydroxide/magnesium hydroxide/simethicone Suspension 30 milliLiter(s) Oral every 4 hours PRN Dyspepsia  melatonin 3 milliGRAM(s) Oral at bedtime PRN Insomnia  ondansetron Injectable 4 milliGRAM(s) IV Push every 8 hours PRN Nausea and/or Vomiting  oxyCODONE    IR 5 milliGRAM(s) Oral every 6 hours PRN for severe pain      CAPILLARY BLOOD GLUCOSE        I&O's Summary      PHYSICAL EXAM:  Vital Signs Last 24 Hrs  T(C): 36.6 (02 Apr 2025 11:27), Max: 37.1 (02 Apr 2025 05:02)  T(F): 97.9 (02 Apr 2025 11:27), Max: 98.7 (02 Apr 2025 05:02)  HR: 83 (02 Apr 2025 11:27) (72 - 92)  BP: 114/72 (02 Apr 2025 11:27) (103/55 - 151/53)  BP(mean): --  RR: 18 (02 Apr 2025 11:27) (17 - 19)  SpO2: 100% (02 Apr 2025 11:27) (98% - 100%)    Parameters below as of 02 Apr 2025 11:27  Patient On (Oxygen Delivery Method): room air        GENERAL: No acute distress  HEAD:  Normocephalic  EYES: Conjunctiva and sclera clear  ENT: open draining wound to submandibular area with erythema and swelling  NECK: Supple  CHEST/LUNG: CTAB  HEART: Regular rate and rhythm  ABDOMEN: Soft, non-tender, non-distended  EXTREMITIES: No clubbing, cyanosis, or edema  NEUROLOGY: A&O x 3  SKIN: No rashes or lesions to visible skin    LABS:                        7.8    2.50  )-----------( 144      ( 02 Apr 2025 06:30 )             23.1     04-02    134[L]  |  104  |  4[L]  ----------------------------<  104[H]  4.4   |  15[L]  |  0.64    Ca    7.7[L]      02 Apr 2025 06:30  Phos  2.4     04-02  Mg     2.10     04-02    TPro  6.1  /  Alb  3.0[L]  /  TBili  0.3  /  DBili  x   /  AST  21  /  ALT  13  /  AlkPhos  84  04-01    PT/INR - ( 01 Apr 2025 06:25 )   PT: 15.8 sec;   INR: 1.33 ratio         PTT - ( 01 Apr 2025 06:25 )  PTT:33.9 sec      Urinalysis Basic - ( 02 Apr 2025 06:30 )    Color: x / Appearance: x / SG: x / pH: x  Gluc: 104 mg/dL / Ketone: x  / Bili: x / Urobili: x   Blood: x / Protein: x / Nitrite: x   Leuk Esterase: x / RBC: x / WBC x   Sq Epi: x / Non Sq Epi: x / Bacteria: x            RADIOLOGY & ADDITIONAL TESTS: Reviewed

## 2025-04-02 NOTE — CONSULT NOTE ADULT - SUBJECTIVE AND OBJECTIVE BOX
79 year old old female with a history of metastatic breast cancer, HTN, CAD and afib that presents to [facility] via transfer from [facility] after being admitted for submandibular swelling and pain.     Circle:    PAST MEDICAL & SURGICAL HISTORY:  Breast cancer  HTN (hypertension)  CAD (coronary artery disease)    Allergies  Vicodin (Unknown)  Intolerances    Physical Exam:   General: AAOx3, NAD  H: Normocephalic             Maxillofacial: TMJ, ARMIDA 40mm, LAD-ve, inferior border of mandible palpable b/l, submental region of face with erythematous, firm, non fluctuant swelling with 1.5x 1cm eschar on left side with abundant purulence draining from the margins, remaining site mild crusts, and petechiae.             Intraoral: FOM soft/NT/NE, no draining intraoral purulence, Partially edentulous maxillary and mandibular dentition, no active infections, mucosa pink, moist and non inflamed, multiple fixed prosthesis  E: PERRLA, EOMI  E: Hearing grossly intact, no otorrhea  N: No septal hematoma, no crepitus, no epistaxis  T: Trachea midline  Neuro: No CN V1-3 or VII neuro deficits.     Vitals:     Vital Signs Last 24 Hrs  T(C): 37.1 (02 Apr 2025 05:02), Max: 37.1 (02 Apr 2025 05:02)  T(F): 98.7 (02 Apr 2025 05:02), Max: 98.7 (02 Apr 2025 05:02)  HR: 92 (02 Apr 2025 05:02) (72 - 92)  BP: 151/53 (02 Apr 2025 05:02) (103/55 - 151/53)  BP(mean): --  RR: 18 (02 Apr 2025 05:02) (17 - 19)  SpO2: 99% (02 Apr 2025 05:02) (98% - 100%)    Parameters below as of 02 Apr 2025 05:02  Patient On (Oxygen Delivery Method): room air        I&O's Detail      Medications:    MEDICATIONS  (STANDING):  amLODIPine   Tablet 5 milliGRAM(s) Oral daily  atorvastatin 20 milliGRAM(s) Oral at bedtime  cholecalciferol 1000 Unit(s) Oral daily  cyanocobalamin 1000 MICROGram(s) Oral daily  DULoxetine 30 milliGRAM(s) Oral daily  letrozole 2.5 milliGRAM(s) Oral daily  metoprolol tartrate 25 milliGRAM(s) Oral every 6 hours  morphine  - Injectable 1 milliGRAM(s) IV Push once  pantoprazole  Injectable 40 milliGRAM(s) IV Push every 12 hours  piperacillin/tazobactam IVPB.. 4.5 Gram(s) IV Intermittent every 8 hours  potassium phosphate / sodium phosphate Powder (PHOS-NaK) 1 Packet(s) Oral every 4 hours  vancomycin  IVPB 1000 milliGRAM(s) IV Intermittent every 24 hours    MEDICATIONS  (PRN):  aluminum hydroxide/magnesium hydroxide/simethicone Suspension 30 milliLiter(s) Oral every 4 hours PRN Dyspepsia  melatonin 3 milliGRAM(s) Oral at bedtime PRN Insomnia  ondansetron Injectable 4 milliGRAM(s) IV Push every 8 hours PRN Nausea and/or Vomiting  oxyCODONE    IR 5 milliGRAM(s) Oral every 6 hours PRN for severe pain      Labs:                          7.8    2.50  )-----------( 144      ( 02 Apr 2025 06:30 )             23.1       04-02    134[L]  |  104  |  4[L]  ----------------------------<  104[H]  4.4   |  15[L]  |  0.64    Ca    7.7[L]      02 Apr 2025 06:30  Phos  2.4     04-02  Mg     2.10     04-02    TPro  6.1  /  Alb  3.0[L]  /  TBili  0.3  /  DBili  x   /  AST  21  /  ALT  13  /  AlkPhos  84  04-01    CT  IMPRESSION:    1. Submandibular space superficial and deep infiltration suggests an active  inflammatory process such as cellulitis. No abscess is recognized    2. Asymmetric fullness left aryepiglottic fold, nonspecific    3. Diffuse sclerotic metastases    --- End of Report ---   79 year old old female with a history of metastatic breast cancer, HTN, CAD and afib that presents to [facility] via transfer from [facility] after being admitted for submandibular swelling and pain.     PAST MEDICAL & SURGICAL HISTORY:  Breast cancer  HTN (hypertension)  CAD (coronary artery disease)    Allergies  Vicodin (Unknown)  Intolerances    Physical Exam:   General: AAOx3, NAD  H: Normocephalic             Maxillofacial: TMJ, ARMIDA 40mm, LAD-ve, inferior border of mandible palpable b/l, submental region of face with erythematous, firm, non fluctuant swelling with 1.5x 1cm eschar on left side with abundant purulence draining from the margins, remaining site mild crusts, and petechiae.             Intraoral: FOM soft/NT/NE, no draining intraoral purulence, Partially edentulous maxillary and mandibular dentition, no active infections, mucosa pink, moist and non inflamed, multiple fixed prosthesis  E: PERRLA, EOMI  E: Hearing grossly intact, no otorrhea  N: No septal hematoma, no crepitus, no epistaxis  T: Trachea midline  Neuro: No CN V1-3 or VII neuro deficits.     Vitals:     Vital Signs Last 24 Hrs  T(C): 37.1 (02 Apr 2025 05:02), Max: 37.1 (02 Apr 2025 05:02)  T(F): 98.7 (02 Apr 2025 05:02), Max: 98.7 (02 Apr 2025 05:02)  HR: 92 (02 Apr 2025 05:02) (72 - 92)  BP: 151/53 (02 Apr 2025 05:02) (103/55 - 151/53)  BP(mean): --  RR: 18 (02 Apr 2025 05:02) (17 - 19)  SpO2: 99% (02 Apr 2025 05:02) (98% - 100%)    Parameters below as of 02 Apr 2025 05:02  Patient On (Oxygen Delivery Method): room air        I&O's Detail      Medications:    MEDICATIONS  (STANDING):  amLODIPine   Tablet 5 milliGRAM(s) Oral daily  atorvastatin 20 milliGRAM(s) Oral at bedtime  cholecalciferol 1000 Unit(s) Oral daily  cyanocobalamin 1000 MICROGram(s) Oral daily  DULoxetine 30 milliGRAM(s) Oral daily  letrozole 2.5 milliGRAM(s) Oral daily  metoprolol tartrate 25 milliGRAM(s) Oral every 6 hours  morphine  - Injectable 1 milliGRAM(s) IV Push once  pantoprazole  Injectable 40 milliGRAM(s) IV Push every 12 hours  piperacillin/tazobactam IVPB.. 4.5 Gram(s) IV Intermittent every 8 hours  potassium phosphate / sodium phosphate Powder (PHOS-NaK) 1 Packet(s) Oral every 4 hours  vancomycin  IVPB 1000 milliGRAM(s) IV Intermittent every 24 hours    MEDICATIONS  (PRN):  aluminum hydroxide/magnesium hydroxide/simethicone Suspension 30 milliLiter(s) Oral every 4 hours PRN Dyspepsia  melatonin 3 milliGRAM(s) Oral at bedtime PRN Insomnia  ondansetron Injectable 4 milliGRAM(s) IV Push every 8 hours PRN Nausea and/or Vomiting  oxyCODONE    IR 5 milliGRAM(s) Oral every 6 hours PRN for severe pain      Labs:                          7.8    2.50  )-----------( 144      ( 02 Apr 2025 06:30 )             23.1       04-02    134[L]  |  104  |  4[L]  ----------------------------<  104[H]  4.4   |  15[L]  |  0.64    Ca    7.7[L]      02 Apr 2025 06:30  Phos  2.4     04-02  Mg     2.10     04-02    TPro  6.1  /  Alb  3.0[L]  /  TBili  0.3  /  DBili  x   /  AST  21  /  ALT  13  /  AlkPhos  84  04-01    CT  IMPRESSION:    1. Submandibular space superficial and deep infiltration suggests an active  inflammatory process such as cellulitis. No abscess is recognized    2. Asymmetric fullness left aryepiglottic fold, nonspecific    3. Diffuse sclerotic metastases    --- End of Report ---

## 2025-04-02 NOTE — PROGRESS NOTE ADULT - PROBLEM SELECTOR PLAN 7
-s/p 1 stent in 2013    ASA on hold   Atorvastatin 20 mg daily -s/p 1 stent in 2013  -Holding ASA  -Continue atorvastatin 20mg qd

## 2025-04-02 NOTE — CONSULT NOTE ADULT - ATTENDING COMMENTS
79 year old female with metastatic cancer on treatment  Presented to OSH with SOB/ submandibular swelling with erythema    Imaging with submandibular space collection    Tx for ENT eval    Superficial culture with strep/anaerobes    OMFS evaluating for drainage of the abscess    Check CBC with differential    Await source control  Stop vancomycin     Can change zosyn to Unasyn    Dental eval per OMFS
79F, metastatic breast ca, CAD s/p PCI, chronic anemia, HTN, transferred from [facility] for management of submandibular abscess.   GI consulted for report of dark stools x 1-2 wks.   Hgb 7's range.   No prior egd/colon.     Long discussion w patient and daugthers at bedside.   Patient does not want to proceed w endoscopic evaluation until completes w/u and management of submandibular infection  R/B discussed in detail   Continue IV PPI BID   trend h/h  monitor bowel movements   f/u heme re: management of anemia   Will plan for endoscopic evaluation once medically optimized. Please call GI team when she is optimized.   Rest of care per primary team   GI to sign off, please call w questions

## 2025-04-02 NOTE — PROGRESS NOTE ADULT - SUBJECTIVE AND OBJECTIVE BOX
Patient is a 79y old  Female who presents with a chief complaint of Submandibular cellulitis (02 Apr 2025 09:50)    Pt seen this afternoon, daughters at bedside. Awaiting final plan from OMFS regarding drainage. Had melena prior but last BM from yesterday evening brown     MEDICATIONS  (STANDING):  amLODIPine   Tablet 5 milliGRAM(s) Oral daily  atorvastatin 20 milliGRAM(s) Oral at bedtime  cholecalciferol 1000 Unit(s) Oral daily  cyanocobalamin 1000 MICROGram(s) Oral daily  DULoxetine 30 milliGRAM(s) Oral daily  letrozole 2.5 milliGRAM(s) Oral daily  metoprolol tartrate 25 milliGRAM(s) Oral every 6 hours  morphine  - Injectable 1 milliGRAM(s) IV Push once  pantoprazole  Injectable 40 milliGRAM(s) IV Push every 12 hours  piperacillin/tazobactam IVPB.. 4.5 Gram(s) IV Intermittent every 8 hours  potassium phosphate / sodium phosphate Powder (PHOS-NaK) 1 Packet(s) Oral every 4 hours  vancomycin  IVPB 1000 milliGRAM(s) IV Intermittent every 24 hours    MEDICATIONS  (PRN):  aluminum hydroxide/magnesium hydroxide/simethicone Suspension 30 milliLiter(s) Oral every 4 hours PRN Dyspepsia  melatonin 3 milliGRAM(s) Oral at bedtime PRN Insomnia  ondansetron Injectable 4 milliGRAM(s) IV Push every 8 hours PRN Nausea and/or Vomiting  oxyCODONE    IR 5 milliGRAM(s) Oral every 6 hours PRN for severe pain        Vital Signs Last 24 Hrs  T(C): 36.6 (02 Apr 2025 11:27), Max: 37.1 (02 Apr 2025 05:02)  T(F): 97.9 (02 Apr 2025 11:27), Max: 98.7 (02 Apr 2025 05:02)  HR: 83 (02 Apr 2025 11:27) (72 - 92)  BP: 114/72 (02 Apr 2025 11:27) (103/55 - 151/53)  BP(mean): --  RR: 18 (02 Apr 2025 11:27) (17 - 19)  SpO2: 100% (02 Apr 2025 11:27) (98% - 100%)    Parameters below as of 02 Apr 2025 11:27  Patient On (Oxygen Delivery Method): room air        PE  NAD  Awake, alert  Anicteric, MMM  submandibular wound with purulent drainage   No c/c/e  No rash grossly  FROM                          7.8    2.50  )-----------( 144      ( 02 Apr 2025 06:30 )             23.1       04-02    134[L]  |  104  |  4[L]  ----------------------------<  104[H]  4.4   |  15[L]  |  0.64    Ca    7.7[L]      02 Apr 2025 06:30  Phos  2.4     04-02  Mg     2.10     04-02    TPro  6.1  /  Alb  3.0[L]  /  TBili  0.3  /  DBili  x   /  AST  21  /  ALT  13  /  AlkPhos  84  04-01

## 2025-04-02 NOTE — PROGRESS NOTE ADULT - PROBLEM SELECTOR PLAN 5
Hgb: 9.9 --> 8.0 --> 7.6 --> 7.1  Iron: 27   TIBC: 188.86  Ferritin: 344  B12: 2447   FA: 13.20  Complained of 1 month history of black stool   Evaluated by GI at [facility] who recommended EGD/Colonoscopy once submandibular cellulitis is treated and to continue Protonix BID   Continues to have black stool     Monitor H&H   Maintain active type and screen   Transfuse if Hgb<8 g/dL   Monitor for further active signs of bleeding   Protonix BID   GI consulted- endoscopy deferred for now, recommending outpt vs inpt when medically optimized  Pt currently declining blood transfusion -Pt reports 1 month history of 'black stool'. Evaluated by GI at [facility] who recommended EGD/Colonoscopy once submandibular cellulitis is treated and to continue Protonix BID   -Last episode of melena 4/1  -Hgb: 9.9 -> 8.0, currently stable between 7-8  -Labs not c/w MERI, B12 or folate deficiency    -Continue IV PPI BID  -Transfuse if Hgb<8 g/dL - multiple discussions with pt- currently declining a blood transfusion, states she is open to reconsidering if she is actively bleeding or symptomatic  -Maintain active type and screen   -CBC daily  -GI consulted- endoscopy deferred for now, recommending outpt vs inpt when medically optimized

## 2025-04-02 NOTE — CHART NOTE - NSCHARTNOTEFT_GEN_A_CORE
Dr. Jacob review the case, defer care to OMFS to do bedside I&D. ID consulted recommends stop vancomycin and zosyn, start unasyn.   - NO ENT surgical intervention needed at this time  - ID recs appreciated  - Defer surgical intervention to OMFS   - ENT sign off  - Case discussed with Dr. Jacob

## 2025-04-02 NOTE — PROGRESS NOTE ADULT - PROBLEM SELECTOR PLAN 3
Hx of stage IV breast cancer. ER+/HI+/Her2 negative with metastases to the bones, ?colon on Ibrance/Femara since 11/2021 now with L3 lesion on Xgeva  Follows with Dr. Cunha at [facility]  Recommended to hold Ibrance during hospitalization as per Oncology recommendations   CTA chest (03/28/2025) demonstrated no acute PE, diffuse osteosclerotic metastatic disease, 4 x 2.6 cm mass inferior quadrant left breast containing coarse calcifications and 0.7 cm short axis left axillary LN    Letrozole 2.5 mg daily -Hx of stage IV breast cancer. ER+/IN+/Her2 negative with metastases to the bones, ?colon on Ibrance/Femara since 11/2021 now with L3 lesion on Xgeva  -Follows with Dr. Bam Cunha at [facility]  -CTA chest (03/28/2025) demonstrated no acute PE, diffuse osteosclerotic metastatic disease, 4 x 2.6 cm mass inferior quadrant left breast containing coarse calcifications and 0.7 cm short axis left axillary LN  -Recommended to hold Ibrance during hospitalization as per Oncology recommendations     -Continue Letrozole 2.5 mg daily

## 2025-04-02 NOTE — PROGRESS NOTE ADULT - ASSESSMENT
80 y/o F with PMHx of metastatic breast cancer (follows with [facility]), HTN, CAD (s/p 1 stent in 2013) presented to the ED at [facility] complaining of 1 week of dyspnea along with several days of submandibular tenderness and erythema. She stated that she has been experiencing dyspnea at rest for 1 week with worsening dyspnea on exertion. She also has been experiencing dental pain and increasing tenderness in the submandibular region with overlying erythema.  ID was consulted and recommended transfer to tertiary facility for possible surgical intervention as she has a large submandibular abscess/furuncle of dental origin and to continue Zosyn and vancomycin. Case was discussed with [facility] and ENT (Dr. Dalal) will evaluate the patient. She was discharged from [facility] on 03/31/2025. She will be admitted for management of submandibular cellulitis and for evaluation by ID and ENT.

## 2025-04-02 NOTE — PROGRESS NOTE ADULT - PROBLEM SELECTOR PLAN 1
Transferred to [facility] for ENT evaluation for possible I & D of possible submandibular abscess/furuncle   CT soft tissue neck C+ (03/28/2025) demonstrated submandibular space superficial and deep infiltration suggestive of cellulitis with no abscess, asymmetric fullness left aryepiglottic fold and diffuse sclerotic metastases.   ESR: 70  Lactate: 3.40 --> 2.00  Procalcitonin: 1.14   CRP: 285  Blood cultures (03/28/25) NGTD x 4 days  Wound culture (03/29/25) growth with few Parvimonas micra and Peptostreptococcus species    C/w vanc/zosyn  ENT and ID consults appreciated -3/28/25 CT soft tissue neck w/: submandibular space superficial and deep infiltration suggestive of cellulitis with no abscess, asymmetric fullness left aryepiglottic fold and diffuse sclerotic metastases.   ESR: 70, CRP: 285  Lactate: 3.40->2.00  Procalcitonin: 1.14   Blood cultures (03/28/25) NGTD x 4 days  Wound culture (03/29/25) growth with few Parvimonas micra and Peptostreptococcus species  ENT, OMFS, and ID consults appreciated    -planned for I&D with OMFS, ENT now signed off  -Vanc/zosyn -> Unasyn

## 2025-04-03 PROBLEM — I25.10 ATHEROSCLEROTIC HEART DISEASE OF NATIVE CORONARY ARTERY WITHOUT ANGINA PECTORIS: Chronic | Status: ACTIVE | Noted: 2025-04-01

## 2025-04-03 PROBLEM — I10 ESSENTIAL (PRIMARY) HYPERTENSION: Chronic | Status: ACTIVE | Noted: 2025-04-01

## 2025-04-03 LAB
ANION GAP SERPL CALC-SCNC: 12 MMOL/L — SIGNIFICANT CHANGE UP (ref 7–14)
BASOPHILS # BLD AUTO: 0.02 K/UL — SIGNIFICANT CHANGE UP (ref 0–0.2)
BASOPHILS NFR BLD AUTO: 0.9 % — SIGNIFICANT CHANGE UP (ref 0–2)
BUN SERPL-MCNC: 3 MG/DL — LOW (ref 7–23)
CALCIUM SERPL-MCNC: 7.4 MG/DL — LOW (ref 8.4–10.5)
CHLORIDE SERPL-SCNC: 105 MMOL/L — SIGNIFICANT CHANGE UP (ref 98–107)
CO2 SERPL-SCNC: 18 MMOL/L — LOW (ref 22–31)
CREAT SERPL-MCNC: 0.67 MG/DL — SIGNIFICANT CHANGE UP (ref 0.5–1.3)
EGFR: 88 ML/MIN/1.73M2 — SIGNIFICANT CHANGE UP
EGFR: 88 ML/MIN/1.73M2 — SIGNIFICANT CHANGE UP
EOSINOPHIL # BLD AUTO: 0.05 K/UL — SIGNIFICANT CHANGE UP (ref 0–0.5)
EOSINOPHIL NFR BLD AUTO: 1.7 % — SIGNIFICANT CHANGE UP (ref 0–6)
GIANT PLATELETS BLD QL SMEAR: PRESENT — SIGNIFICANT CHANGE UP
GLUCOSE SERPL-MCNC: 108 MG/DL — HIGH (ref 70–99)
HCT VFR BLD CALC: 22.6 % — LOW (ref 34.5–45)
HGB BLD-MCNC: 7.4 G/DL — LOW (ref 11.5–15.5)
IANC: 1.49 K/UL — LOW (ref 1.8–7.4)
LYMPHOCYTES # BLD AUTO: 0.54 K/UL — LOW (ref 1–3.3)
LYMPHOCYTES # BLD AUTO: 20.2 % — SIGNIFICANT CHANGE UP (ref 13–44)
MACROCYTES BLD QL: SIGNIFICANT CHANGE UP
MAGNESIUM SERPL-MCNC: 2.1 MG/DL — SIGNIFICANT CHANGE UP (ref 1.6–2.6)
MANUAL SMEAR VERIFICATION: SIGNIFICANT CHANGE UP
MCHC RBC-ENTMCNC: 32.7 G/DL — SIGNIFICANT CHANGE UP (ref 32–36)
MCHC RBC-ENTMCNC: 38.9 PG — HIGH (ref 27–34)
MCV RBC AUTO: 118.9 FL — HIGH (ref 80–100)
MONOCYTES # BLD AUTO: 0.28 K/UL — SIGNIFICANT CHANGE UP (ref 0–0.9)
MONOCYTES NFR BLD AUTO: 10.5 % — SIGNIFICANT CHANGE UP (ref 2–14)
MYELOCYTES NFR BLD: 1.8 % — HIGH (ref 0–0)
NEUTROPHILS # BLD AUTO: 1.7 K/UL — LOW (ref 1.8–7.4)
NEUTROPHILS NFR BLD AUTO: 62.3 % — SIGNIFICANT CHANGE UP (ref 43–77)
NEUTS BAND # BLD: 0.9 % — SIGNIFICANT CHANGE UP (ref 0–6)
NEUTS BAND NFR BLD: 0.9 % — SIGNIFICANT CHANGE UP (ref 0–6)
NRBC # BLD: 1 /100 WBCS — HIGH (ref 0–0)
NRBC BLD-RTO: 1 /100 WBCS — HIGH (ref 0–0)
PHOSPHATE SERPL-MCNC: 1.1 MG/DL — LOW (ref 2.5–4.5)
PLAT MORPH BLD: ABNORMAL
PLATELET # BLD AUTO: 148 K/UL — LOW (ref 150–400)
PLATELET COUNT - ESTIMATE: NORMAL — SIGNIFICANT CHANGE UP
POTASSIUM SERPL-MCNC: 3.9 MMOL/L — SIGNIFICANT CHANGE UP (ref 3.5–5.3)
POTASSIUM SERPL-SCNC: 3.9 MMOL/L — SIGNIFICANT CHANGE UP (ref 3.5–5.3)
RBC # BLD: 1.9 M/UL — LOW (ref 3.8–5.2)
RBC # FLD: 14.5 % — SIGNIFICANT CHANGE UP (ref 10.3–14.5)
RBC BLD AUTO: NORMAL — SIGNIFICANT CHANGE UP
SODIUM SERPL-SCNC: 135 MMOL/L — SIGNIFICANT CHANGE UP (ref 135–145)
VARIANT LYMPHS # BLD: 1.7 % — SIGNIFICANT CHANGE UP (ref 0–6)
VARIANT LYMPHS NFR BLD MANUAL: 1.7 % — SIGNIFICANT CHANGE UP (ref 0–6)
WBC # BLD: 2.69 K/UL — LOW (ref 3.8–10.5)
WBC # FLD AUTO: 2.69 K/UL — LOW (ref 3.8–10.5)

## 2025-04-03 PROCEDURE — 99233 SBSQ HOSP IP/OBS HIGH 50: CPT

## 2025-04-03 PROCEDURE — 99232 SBSQ HOSP IP/OBS MODERATE 35: CPT

## 2025-04-03 PROCEDURE — G0545: CPT

## 2025-04-03 RX ORDER — SOD PHOS DI, MONO/K PHOS MONO 250 MG
1 TABLET ORAL ONCE
Refills: 0 | Status: COMPLETED | OUTPATIENT
Start: 2025-04-03 | End: 2025-04-03

## 2025-04-03 RX ORDER — OXYCODONE HYDROCHLORIDE 30 MG/1
5 TABLET ORAL EVERY 6 HOURS
Refills: 0 | Status: DISCONTINUED | OUTPATIENT
Start: 2025-04-03 | End: 2025-04-04

## 2025-04-03 RX ORDER — POTASSIUM PHOSPHATE, MONOBASIC POTASSIUM PHOSPHATE, DIBASIC INJECTION, 236; 224 MG/ML; MG/ML
30 SOLUTION, CONCENTRATE INTRAVENOUS ONCE
Refills: 0 | Status: COMPLETED | OUTPATIENT
Start: 2025-04-03 | End: 2025-04-03

## 2025-04-03 RX ADMIN — OXYCODONE HYDROCHLORIDE 5 MILLIGRAM(S): 30 TABLET ORAL at 17:17

## 2025-04-03 RX ADMIN — Medication 40 MILLIGRAM(S): at 05:30

## 2025-04-03 RX ADMIN — DULOXETINE 30 MILLIGRAM(S): 20 CAPSULE, DELAYED RELEASE ORAL at 12:40

## 2025-04-03 RX ADMIN — OXYCODONE HYDROCHLORIDE 5 MILLIGRAM(S): 30 TABLET ORAL at 18:17

## 2025-04-03 RX ADMIN — METOPROLOL SUCCINATE 25 MILLIGRAM(S): 50 TABLET, EXTENDED RELEASE ORAL at 05:31

## 2025-04-03 RX ADMIN — AMPICILLIN SODIUM AND SULBACTAM SODIUM 200 GRAM(S): 1; .5 INJECTION, POWDER, FOR SOLUTION INTRAMUSCULAR; INTRAVENOUS at 17:54

## 2025-04-03 RX ADMIN — POTASSIUM PHOSPHATE, MONOBASIC POTASSIUM PHOSPHATE, DIBASIC INJECTION, 83.33 MILLIMOLE(S): 236; 224 SOLUTION, CONCENTRATE INTRAVENOUS at 17:54

## 2025-04-03 RX ADMIN — METOPROLOL SUCCINATE 25 MILLIGRAM(S): 50 TABLET, EXTENDED RELEASE ORAL at 23:17

## 2025-04-03 RX ADMIN — Medication 1000 UNIT(S): at 12:40

## 2025-04-03 RX ADMIN — ATORVASTATIN CALCIUM 20 MILLIGRAM(S): 80 TABLET, FILM COATED ORAL at 23:17

## 2025-04-03 RX ADMIN — LETROZOLE 2.5 MILLIGRAM(S): 2.5 TABLET, FILM COATED ORAL at 12:40

## 2025-04-03 RX ADMIN — AMPICILLIN SODIUM AND SULBACTAM SODIUM 200 GRAM(S): 1; .5 INJECTION, POWDER, FOR SOLUTION INTRAMUSCULAR; INTRAVENOUS at 00:37

## 2025-04-03 RX ADMIN — AMLODIPINE BESYLATE 5 MILLIGRAM(S): 10 TABLET ORAL at 05:30

## 2025-04-03 RX ADMIN — AMPICILLIN SODIUM AND SULBACTAM SODIUM 200 GRAM(S): 1; .5 INJECTION, POWDER, FOR SOLUTION INTRAMUSCULAR; INTRAVENOUS at 05:30

## 2025-04-03 RX ADMIN — Medication 40 MILLIGRAM(S): at 17:55

## 2025-04-03 RX ADMIN — CYANOCOBALAMIN 1000 MICROGRAM(S): 1000 INJECTION INTRAMUSCULAR; SUBCUTANEOUS at 12:41

## 2025-04-03 RX ADMIN — AMPICILLIN SODIUM AND SULBACTAM SODIUM 200 GRAM(S): 1; .5 INJECTION, POWDER, FOR SOLUTION INTRAMUSCULAR; INTRAVENOUS at 23:17

## 2025-04-03 RX ADMIN — AMPICILLIN SODIUM AND SULBACTAM SODIUM 200 GRAM(S): 1; .5 INJECTION, POWDER, FOR SOLUTION INTRAMUSCULAR; INTRAVENOUS at 13:15

## 2025-04-03 RX ADMIN — Medication 1 PACKET(S): at 17:55

## 2025-04-03 RX ADMIN — METOPROLOL SUCCINATE 25 MILLIGRAM(S): 50 TABLET, EXTENDED RELEASE ORAL at 00:36

## 2025-04-03 NOTE — PROGRESS NOTE ADULT - PROBLEM SELECTOR PLAN 2
-Pt with new AF with RVR at [facility], now resolved  -TSH WNL  -Evaluated by Cardiology who recommended to continue Eliquis   -CHADsVASc score of 3    -Per risks vs benefits discussion with pt and family at bedside given melena, anemia, and new AF, informed decision made to discontinue eliquis at this time

## 2025-04-03 NOTE — PROGRESS NOTE ADULT - ASSESSMENT
This is an 79 year old female with metastatic breast cancer who was transferred here for evaluation of submandibuar wound.    1. Metastatic breast cancer   -- Diagnosed in 2021 via biopsy of iliac bone confirming metastatic breast cancer   -- She has been on palbociclib and letrozole since. Hold palbociclib for now, may continue letrozole inpatient   -- Follow up with Dr. Bam Cunha of [facility] after discharge.    2. Cellulitis   -- Pt transferred from [facility] for evaluation and treatment of cellulitis of submandibular region w possible abscess   -- ENT consulted, defer management to OMFS  --s/p I&D and debridemen 4/2 with OMFS, likely MRONJ, f/u wound culture, 2x culture   -- ID following, patient on Ampicillin  -- Pt has been on monthly Xgeva, last dose 01/29/2025     3. Anemia   -- Baseline hemoglobin outpatient 9-10   -- Iron studies w/o evidence of iron deficiency   -- May be related to palbociclib though pt has been on this since 2021. Likely worsened in setting of GIB and acute infection  -- GI consulted, however pt and family deferring endoscopic evaluation until MRONJ treatment has completed  -- Reports brown stool last night   -- Monitor CBC and transfuse to maintain hg >7    Will continue to follow.    Linnette Harrison NP  Hematology/Oncology  [facility]  [phone] (Office)  [phone] (Alt office)  Evenings and weekends please call MD on call or office

## 2025-04-03 NOTE — PROGRESS NOTE ADULT - SUBJECTIVE AND OBJECTIVE BOX
Follow Up:      Inverval History/ROS:Patient is a 79y old  Female who presents with a chief complaint of Submandibular cellulitis (03 Apr 2025 12:36)      Allergies    Vicodin (Unknown)    Intolerances        ANTIMICROBIALS:  ampicillin/sulbactam  IVPB 3 every 6 hours      OTHER MEDS:  aluminum hydroxide/magnesium hydroxide/simethicone Suspension 30 milliLiter(s) Oral every 4 hours PRN  amLODIPine   Tablet 5 milliGRAM(s) Oral daily  atorvastatin 20 milliGRAM(s) Oral at bedtime  cholecalciferol 1000 Unit(s) Oral daily  cyanocobalamin 1000 MICROGram(s) Oral daily  DULoxetine 30 milliGRAM(s) Oral daily  letrozole 2.5 milliGRAM(s) Oral daily  melatonin 3 milliGRAM(s) Oral at bedtime PRN  metoprolol tartrate 25 milliGRAM(s) Oral every 6 hours  morphine  - Injectable 1 milliGRAM(s) IV Push once  ondansetron Injectable 4 milliGRAM(s) IV Push every 8 hours PRN  oxyCODONE    IR 5 milliGRAM(s) Oral every 6 hours PRN  pantoprazole  Injectable 40 milliGRAM(s) IV Push every 12 hours      Vital Signs Last 24 Hrs  T(C): 36.4 (03 Apr 2025 12:13), Max: 36.8 (02 Apr 2025 18:30)  T(F): 97.6 (03 Apr 2025 12:13), Max: 98.3 (02 Apr 2025 18:30)  HR: 63 (03 Apr 2025 12:13) (63 - 101)  BP: 126/51 (03 Apr 2025 12:13) (123/67 - 136/50)  BP(mean): --  RR: 17 (03 Apr 2025 12:13) (17 - 18)  SpO2: 100% (03 Apr 2025 12:13) (96% - 100%)    Parameters below as of 03 Apr 2025 12:13  Patient On (Oxygen Delivery Method): room air        PHYSICAL EXAM:  General: [ ] non-toxic  HEAD/EYES: [ ] PERRL [ ] white sclera [ ] icterus  ENT:  [ ] normal [ ] supple [ ] thrush [ ] pharyngeal exudate  Cardiovascular:   [ ] murmur [ ] normal [ ] PPM/AICD  Respiratory:  [ ] clear to ausculation bilaterally  GI:  [ ] soft, non-tender, normal bowel sounds  :  [ ] goldberg [ ] no CVA tenderness   Musculoskeletal:  [ ] no synovitis  Neurologic:  [ ] non-focal exam   Skin:  [ ] no rash  Lymph: [ ] no lymphadenopathy  Psychiatric:  [ ] appropriate affect [ ] alert & oriented  Lines:  [ ] no phlebitis [ ] central line                                7.4    2.69  )-----------( 148      ( 03 Apr 2025 11:00 )             22.6       04-03    135  |  105  |  3[L]  ----------------------------<  108[H]  3.9   |  18[L]  |  0.67    Ca    7.4[L]      03 Apr 2025 11:00  Phos  1.1     04-03  Mg     2.10     04-03        Urinalysis Basic - ( 03 Apr 2025 11:00 )    Color: x / Appearance: x / SG: x / pH: x  Gluc: 108 mg/dL / Ketone: x  / Bili: x / Urobili: x   Blood: x / Protein: x / Nitrite: x   Leuk Esterase: x / RBC: x / WBC x   Sq Epi: x / Non Sq Epi: x / Bacteria: x        MICROBIOLOGY:Culture Results:   Moderate Yeast isolated  No enteric gram negative rods isolated  No enteric pathogens to date: Final culture pending (04-02-25 @ 09:25)      RADIOLOGY:

## 2025-04-03 NOTE — PROGRESS NOTE ADULT - ASSESSMENT
80 y/o F with PMHx of metastatic breast cancer (follows with [facility]), HTN, CAD (s/p 1 stent in 2013) presented to the ED at [facility] complaining of 1 week of dyspnea along with several days of submandibular tenderness and erythema. She stated that she has been experiencing dyspnea at rest for 1 week with worsening dyspnea on exertion. She also has been experiencing dental pain and increasing tenderness in the submandibular region with overlying erythema.  ID was consulted and recommended transfer to tertiary facility for possible surgical intervention as she has a large submandibular abscess/furuncle of dental origin and to continue Zosyn and vancomycin. Case was discussed with [facility] and ENT (Dr. Dalal) will evaluate the patient. She was discharged from [facility] on 03/31/2025. She will be admitted for management of submandibular cellulitis and for evaluation by ID and ENT.

## 2025-04-03 NOTE — PROGRESS NOTE ADULT - PROBLEM SELECTOR PLAN 5
-Pt reports 1 month history of 'black stool'. Evaluated by GI at [facility] who recommended EGD/Colonoscopy once submandibular cellulitis is treated and to continue Protonix BID   -Last episode of melena 4/1. FOBT+  -Hgb: 9.9 -> 8.0, currently stable between 7-8  -Labs not c/w MERI, B12 or folate deficiency    -Continue IV PPI BID  -Transfuse if Hgb<8 g/dL - multiple discussions with pt- currently declining a blood transfusion, states she is open to reconsidering if she is actively bleeding or symptomatic  -Maintain active type and screen   -CBC daily  -GI consulted- endoscopy deferred for now, recommending outpt vs inpt when medically optimized

## 2025-04-03 NOTE — PROGRESS NOTE ADULT - ASSESSMENT
79 year old female with metastatic cancer on treatment  Presented to OSH with SOB/ submandibular swelling with erythema    Imaging with submandibular space collection    Tx for ENT eval    Superficial culture with strep/anaerobes    S/p drainage of the abscess by OMFS    Continue Unasyn pending culture data    Dental eval per OMFS

## 2025-04-03 NOTE — PROGRESS NOTE ADULT - ASSESSMENT
79 year old old female with a history of metastatic breast cancer, h/o Xgeva use since 2021, HTN, CAD and Afib  presents to [facility] with stage III MRONJ of submental region now POD1 s/p incision and drainage of affected site.    -will need 2x daily dressing changes to chin wet to dry with saline soaked guaze and dry guaze  -rec ID consult for long term abx/PICC line  -continue IV abx  -Multimodal pain management      Greta Stephenson  Oral & Maxillofacial Surgery   #[id number]  Available on Teams

## 2025-04-03 NOTE — PROGRESS NOTE ADULT - PROBLEM SELECTOR PLAN 6
-Mild hyponatremia to 132 upon transfer  -Resolved -Mild hyponatremia to 132 upon transfer  -Resolved    #Hypophosphatemia  Likely due to poor PO intake  IV and PO repletion today  Continue to trend

## 2025-04-03 NOTE — PROGRESS NOTE ADULT - PROBLEM SELECTOR PLAN 1
-3/28/25 CT soft tissue neck w/: submandibular space superficial and deep infiltration suggestive of cellulitis with no abscess, asymmetric fullness left aryepiglottic fold and diffuse sclerotic metastases.   ESR: 70, CRP: 285  Lactate: 3.40->2.00  Procalcitonin: 1.14   Blood cultures (03/28/25) NGTD x 4 days  Wound culture (03/29/25) growth with few Parvimonas micra and Peptostreptococcus species  ENT, OMFS, and ID consults appreciated    -s/p I&D on 4/2 with OMFS, ENT now signed off  -Vanc/zosyn -> Unasyn on 4/2  -f/u wound cultures

## 2025-04-03 NOTE — PROGRESS NOTE ADULT - PROBLEM SELECTOR PLAN 3
-Hx of stage IV breast cancer. ER+/SC+/Her2 negative with metastases to the bones, ?colon on Ibrance/Femara since 11/2021 now with L3 lesion on Xgeva  -Follows with Dr. Bam Cunha at [facility]  -CTA chest (03/28/2025) demonstrated no acute PE, diffuse osteosclerotic metastatic disease, 4 x 2.6 cm mass inferior quadrant left breast containing coarse calcifications and 0.7 cm short axis left axillary LN  -Recommended to hold Ibrance during hospitalization as per Oncology recommendations     -Continue Letrozole 2.5 mg daily

## 2025-04-03 NOTE — PROGRESS NOTE ADULT - SUBJECTIVE AND OBJECTIVE BOX
Patient is a 79y old  Female who presents with a chief complaint of Submandibular cellulitis (02 Apr 2025 15:23)  patient seen today, daughters at bedside  s/p I&D, continues ampicilin      MEDICATIONS  (STANDING):  amLODIPine   Tablet 5 milliGRAM(s) Oral daily  ampicillin/sulbactam  IVPB 3 Gram(s) IV Intermittent every 6 hours  atorvastatin 20 milliGRAM(s) Oral at bedtime  cholecalciferol 1000 Unit(s) Oral daily  cyanocobalamin 1000 MICROGram(s) Oral daily  DULoxetine 30 milliGRAM(s) Oral daily  letrozole 2.5 milliGRAM(s) Oral daily  metoprolol tartrate 25 milliGRAM(s) Oral every 6 hours  morphine  - Injectable 1 milliGRAM(s) IV Push once  pantoprazole  Injectable 40 milliGRAM(s) IV Push every 12 hours    MEDICATIONS  (PRN):  aluminum hydroxide/magnesium hydroxide/simethicone Suspension 30 milliLiter(s) Oral every 4 hours PRN Dyspepsia  melatonin 3 milliGRAM(s) Oral at bedtime PRN Insomnia  ondansetron Injectable 4 milliGRAM(s) IV Push every 8 hours PRN Nausea and/or Vomiting  oxyCODONE    IR 5 milliGRAM(s) Oral every 6 hours PRN for severe pain        Vital Signs Last 24 Hrs  T(C): 36.4 (03 Apr 2025 12:13), Max: 36.8 (02 Apr 2025 18:30)  T(F): 97.6 (03 Apr 2025 12:13), Max: 98.3 (02 Apr 2025 18:30)  HR: 63 (03 Apr 2025 12:13) (63 - 101)  BP: 126/51 (03 Apr 2025 12:13) (123/67 - 136/50)  BP(mean): --  RR: 17 (03 Apr 2025 12:13) (17 - 18)  SpO2: 100% (03 Apr 2025 12:13) (96% - 100%)    Parameters below as of 03 Apr 2025 12:13  Patient On (Oxygen Delivery Method): room air        PE  NAD  Awake, alert  Anicteric, MMM  RRR  CTAB  Abd soft, NT, ND  No c/c/e  No rash grossly                            7.4    2.69  )-----------( 148      ( 03 Apr 2025 11:00 )             22.6       04-03    135  |  105  |  3[L]  ----------------------------<  108[H]  3.9   |  18[L]  |  0.67    Ca    7.4[L]      03 Apr 2025 11:00  Phos  1.1     04-03  Mg     2.10     04-03

## 2025-04-03 NOTE — PROGRESS NOTE ADULT - SUBJECTIVE AND OBJECTIVE BOX
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    PAST MEDICAL & SURGICAL HISTORY:  Breast cancer  HTN (hypertension)  CAD (coronary artery disease)    Allergies  Vicodin (Unknown)  Intolerances    Physical Exam:   General: AAOx3, NAD  H: Normocephalic             Maxillofacial: TMJ, ARMIDA 40mm, LAD-ve, inferior border of mandible palpable b/l, submental region of face with erythematous, with dressing intact             Intraoral: FOM soft/NT/NE, no draining intraoral purulence, Partially edentulous maxillary and mandibular dentition, no active infections, mucosa pink, moist and non inflamed, multiple fixed prosthesis  E: PERRLA, EOMI  E: Hearing grossly intact, no otorrhea  N: No septal hematoma, no crepitus, no epistaxis  T: Trachea midline  Neuro: No CN V1-3 or VII neuro deficits.     Vitals:     Vital Signs Last 24 Hrs  T(C): 36.4 (03 Apr 2025 12:13), Max: 36.8 (02 Apr 2025 18:30)  T(F): 97.6 (03 Apr 2025 12:13), Max: 98.3 (02 Apr 2025 18:30)  HR: 63 (03 Apr 2025 12:13) (63 - 101)  BP: 126/51 (03 Apr 2025 12:13) (123/67 - 136/50)  BP(mean): --  RR: 17 (03 Apr 2025 12:13) (17 - 18)  SpO2: 100% (03 Apr 2025 12:13) (96% - 100%)    Parameters below as of 03 Apr 2025 12:13  Patient On (Oxygen Delivery Method): room air        I&O's Detail      Medications:    MEDICATIONS  (STANDING):  amLODIPine   Tablet 5 milliGRAM(s) Oral daily  ampicillin/sulbactam  IVPB 3 Gram(s) IV Intermittent every 6 hours  atorvastatin 20 milliGRAM(s) Oral at bedtime  cholecalciferol 1000 Unit(s) Oral daily  cyanocobalamin 1000 MICROGram(s) Oral daily  DULoxetine 30 milliGRAM(s) Oral daily  letrozole 2.5 milliGRAM(s) Oral daily  metoprolol tartrate 25 milliGRAM(s) Oral every 6 hours  morphine  - Injectable 1 milliGRAM(s) IV Push once  pantoprazole  Injectable 40 milliGRAM(s) IV Push every 12 hours  potassium phosphate / sodium phosphate Powder (PHOS-NaK) 1 Packet(s) Oral once  potassium phosphate IVPB 30 milliMole(s) IV Intermittent once    MEDICATIONS  (PRN):  aluminum hydroxide/magnesium hydroxide/simethicone Suspension 30 milliLiter(s) Oral every 4 hours PRN Dyspepsia  melatonin 3 milliGRAM(s) Oral at bedtime PRN Insomnia  ondansetron Injectable 4 milliGRAM(s) IV Push every 8 hours PRN Nausea and/or Vomiting  oxyCODONE    IR 5 milliGRAM(s) Oral every 6 hours PRN for severe pain      Labs:                          7.4    2.69  )-----------( 148      ( 03 Apr 2025 11:00 )             22.6       04-03    135  |  105  |  3[L]  ----------------------------<  108[H]  3.9   |  18[L]  |  0.67    Ca    7.4[L]      03 Apr 2025 11:00  Phos  1.1     04-03  Mg     2.10     04-03

## 2025-04-03 NOTE — PROGRESS NOTE ADULT - SUBJECTIVE AND OBJECTIVE BOX
PROGRESS NOTE:   Authored by Annika Gayle Ma, MD  Available on MS Teams; Pager [phone]    Patient is a 79y old  Female who presents with a chief complaint of Submandibular cellulitis (03 Apr 2025 13:14)      SUBJECTIVE / OVERNIGHT EVENTS: Pt is s/p I&D yesterday. She denies any pain or other symptoms this AM. States she hasn't had another BM since yesterday. She denies any bleeding.    All other review of systems is negative unless indicated above.    MEDICATIONS  (STANDING):  amLODIPine   Tablet 5 milliGRAM(s) Oral daily  ampicillin/sulbactam  IVPB 3 Gram(s) IV Intermittent every 6 hours  atorvastatin 20 milliGRAM(s) Oral at bedtime  cholecalciferol 1000 Unit(s) Oral daily  cyanocobalamin 1000 MICROGram(s) Oral daily  DULoxetine 30 milliGRAM(s) Oral daily  letrozole 2.5 milliGRAM(s) Oral daily  metoprolol tartrate 25 milliGRAM(s) Oral every 6 hours  morphine  - Injectable 1 milliGRAM(s) IV Push once  pantoprazole  Injectable 40 milliGRAM(s) IV Push every 12 hours  potassium phosphate / sodium phosphate Powder (PHOS-NaK) 1 Packet(s) Oral once  potassium phosphate IVPB 30 milliMole(s) IV Intermittent once    MEDICATIONS  (PRN):  aluminum hydroxide/magnesium hydroxide/simethicone Suspension 30 milliLiter(s) Oral every 4 hours PRN Dyspepsia  melatonin 3 milliGRAM(s) Oral at bedtime PRN Insomnia  ondansetron Injectable 4 milliGRAM(s) IV Push every 8 hours PRN Nausea and/or Vomiting  oxyCODONE    IR 5 milliGRAM(s) Oral every 6 hours PRN for severe pain      CAPILLARY BLOOD GLUCOSE        I&O's Summary      PHYSICAL EXAM:  Vital Signs Last 24 Hrs  T(C): 36.4 (03 Apr 2025 12:13), Max: 36.8 (02 Apr 2025 18:30)  T(F): 97.6 (03 Apr 2025 12:13), Max: 98.3 (02 Apr 2025 18:30)  HR: 63 (03 Apr 2025 12:13) (63 - 101)  BP: 126/51 (03 Apr 2025 12:13) (123/67 - 136/50)  BP(mean): --  RR: 17 (03 Apr 2025 12:13) (17 - 18)  SpO2: 100% (03 Apr 2025 12:13) (96% - 100%)    Parameters below as of 03 Apr 2025 12:13  Patient On (Oxygen Delivery Method): room air        GENERAL: No acute distress  HEAD:  Normocephalic  EYES: Conjunctiva and sclera clear  ENT: Submandibular region with overlying dressing  NECK: Supple  CHEST/LUNG: CTAB  HEART: Regular rate and rhythm  ABDOMEN: Soft, non-tender, non-distended  EXTREMITIES: No clubbing, cyanosis, or edema  NEUROLOGY: A&O x 3  SKIN: No rashes or lesions to visible skin    LABS:                        7.4    2.69  )-----------( 148      ( 03 Apr 2025 11:00 )             22.6     04-03    135  |  105  |  3[L]  ----------------------------<  108[H]  3.9   |  18[L]  |  0.67    Ca    7.4[L]      03 Apr 2025 11:00  Phos  1.1     04-03  Mg     2.10     04-03            Urinalysis Basic - ( 03 Apr 2025 11:00 )    Color: x / Appearance: x / SG: x / pH: x  Gluc: 108 mg/dL / Ketone: x  / Bili: x / Urobili: x   Blood: x / Protein: x / Nitrite: x   Leuk Esterase: x / RBC: x / WBC x   Sq Epi: x / Non Sq Epi: x / Bacteria: x        Culture - Stool (collected 02 Apr 2025 09:25)  Source: Stool Feces  Preliminary Report (03 Apr 2025 12:29):    Moderate Yeast isolated    No enteric gram negative rods isolated    No enteric pathogens to date: Final culture pending          RADIOLOGY & ADDITIONAL TESTS: Reviewed

## 2025-04-04 ENCOUNTER — TRANSCRIPTION ENCOUNTER (OUTPATIENT)
Age: 80
End: 2025-04-04

## 2025-04-04 VITALS — SYSTOLIC BLOOD PRESSURE: 123 MMHG | HEART RATE: 73 BPM | DIASTOLIC BLOOD PRESSURE: 55 MMHG

## 2025-04-04 LAB
ANION GAP SERPL CALC-SCNC: 16 MMOL/L — HIGH (ref 7–14)
BASOPHILS # BLD AUTO: 0.03 K/UL — SIGNIFICANT CHANGE UP (ref 0–0.2)
BASOPHILS NFR BLD AUTO: 1.1 % — SIGNIFICANT CHANGE UP (ref 0–2)
BUN SERPL-MCNC: 3 MG/DL — LOW (ref 7–23)
CALCIUM SERPL-MCNC: 7.5 MG/DL — LOW (ref 8.4–10.5)
CHLORIDE SERPL-SCNC: 106 MMOL/L — SIGNIFICANT CHANGE UP (ref 98–107)
CO2 SERPL-SCNC: 18 MMOL/L — LOW (ref 22–31)
CREAT SERPL-MCNC: 0.67 MG/DL — SIGNIFICANT CHANGE UP (ref 0.5–1.3)
CULTURE RESULTS: ABNORMAL
EGFR: 88 ML/MIN/1.73M2 — SIGNIFICANT CHANGE UP
EGFR: 88 ML/MIN/1.73M2 — SIGNIFICANT CHANGE UP
EOSINOPHIL # BLD AUTO: 0.02 K/UL — SIGNIFICANT CHANGE UP (ref 0–0.5)
EOSINOPHIL NFR BLD AUTO: 0.8 % — SIGNIFICANT CHANGE UP (ref 0–6)
GLUCOSE SERPL-MCNC: 98 MG/DL — SIGNIFICANT CHANGE UP (ref 70–99)
HCT VFR BLD CALC: 23.6 % — LOW (ref 34.5–45)
HGB BLD-MCNC: 7.6 G/DL — LOW (ref 11.5–15.5)
IANC: 1.32 K/UL — LOW (ref 1.8–7.4)
IMM GRANULOCYTES NFR BLD AUTO: 1.5 % — HIGH (ref 0–0.9)
LYMPHOCYTES # BLD AUTO: 0.82 K/UL — LOW (ref 1–3.3)
LYMPHOCYTES # BLD AUTO: 30.9 % — SIGNIFICANT CHANGE UP (ref 13–44)
MAGNESIUM SERPL-MCNC: 2.1 MG/DL — SIGNIFICANT CHANGE UP (ref 1.6–2.6)
MCHC RBC-ENTMCNC: 32.2 G/DL — SIGNIFICANT CHANGE UP (ref 32–36)
MCHC RBC-ENTMCNC: 39 PG — HIGH (ref 27–34)
MCV RBC AUTO: 121 FL — HIGH (ref 80–100)
MONOCYTES # BLD AUTO: 0.42 K/UL — SIGNIFICANT CHANGE UP (ref 0–0.9)
MONOCYTES NFR BLD AUTO: 15.8 % — HIGH (ref 2–14)
NEUTROPHILS # BLD AUTO: 1.32 K/UL — LOW (ref 1.8–7.4)
NEUTROPHILS NFR BLD AUTO: 49.9 % — SIGNIFICANT CHANGE UP (ref 43–77)
NRBC # BLD AUTO: 0.02 K/UL — HIGH (ref 0–0)
NRBC # FLD: 0.02 K/UL — HIGH (ref 0–0)
NRBC BLD AUTO-RTO: 0 /100 WBCS — SIGNIFICANT CHANGE UP (ref 0–0)
PHOSPHATE SERPL-MCNC: 2.2 MG/DL — LOW (ref 2.5–4.5)
PLATELET # BLD AUTO: 155 K/UL — SIGNIFICANT CHANGE UP (ref 150–400)
POTASSIUM SERPL-MCNC: 5 MMOL/L — SIGNIFICANT CHANGE UP (ref 3.5–5.3)
POTASSIUM SERPL-SCNC: 5 MMOL/L — SIGNIFICANT CHANGE UP (ref 3.5–5.3)
RBC # BLD: 1.95 M/UL — LOW (ref 3.8–5.2)
RBC # FLD: 14.2 % — SIGNIFICANT CHANGE UP (ref 10.3–14.5)
SODIUM SERPL-SCNC: 140 MMOL/L — SIGNIFICANT CHANGE UP (ref 135–145)
SPECIMEN SOURCE: SIGNIFICANT CHANGE UP
WBC # BLD: 2.65 K/UL — LOW (ref 3.8–10.5)
WBC # FLD AUTO: 2.65 K/UL — LOW (ref 3.8–10.5)

## 2025-04-04 PROCEDURE — 99233 SBSQ HOSP IP/OBS HIGH 50: CPT

## 2025-04-04 PROCEDURE — 99232 SBSQ HOSP IP/OBS MODERATE 35: CPT

## 2025-04-04 PROCEDURE — G0545: CPT

## 2025-04-04 RX ORDER — METOPROLOL SUCCINATE 50 MG/1
1 TABLET, EXTENDED RELEASE ORAL
Refills: 0 | DISCHARGE

## 2025-04-04 RX ORDER — AMOXICILLIN AND CLAVULANATE POTASSIUM 500; 125 MG/1; MG/1
875 TABLET, FILM COATED ORAL
Qty: 12 | Refills: 0
Start: 2025-04-04 | End: 2025-04-09

## 2025-04-04 RX ORDER — METOPROLOL SUCCINATE 50 MG/1
25 TABLET, EXTENDED RELEASE ORAL EVERY 6 HOURS
Refills: 0 | Status: DISCONTINUED | OUTPATIENT
Start: 2025-04-04 | End: 2025-04-04

## 2025-04-04 RX ORDER — OXYCODONE HYDROCHLORIDE 30 MG/1
1 TABLET ORAL
Qty: 16 | Refills: 0
Start: 2025-04-04 | End: 2025-04-07

## 2025-04-04 RX ORDER — OXYCODONE HYDROCHLORIDE 30 MG/1
1 TABLET ORAL
Refills: 0 | DISCHARGE

## 2025-04-04 RX ORDER — METOPROLOL SUCCINATE 50 MG/1
1 TABLET, EXTENDED RELEASE ORAL
Qty: 120 | Refills: 0
Start: 2025-04-04 | End: 2025-05-03

## 2025-04-04 RX ORDER — APIXABAN 2.5 MG/1
1 TABLET, FILM COATED ORAL
Refills: 0 | DISCHARGE

## 2025-04-04 RX ADMIN — CYANOCOBALAMIN 1000 MICROGRAM(S): 1000 INJECTION INTRAMUSCULAR; SUBCUTANEOUS at 12:15

## 2025-04-04 RX ADMIN — METOPROLOL SUCCINATE 25 MILLIGRAM(S): 50 TABLET, EXTENDED RELEASE ORAL at 05:42

## 2025-04-04 RX ADMIN — Medication 1000 UNIT(S): at 12:16

## 2025-04-04 RX ADMIN — Medication 40 MILLIGRAM(S): at 17:14

## 2025-04-04 RX ADMIN — OXYCODONE HYDROCHLORIDE 5 MILLIGRAM(S): 30 TABLET ORAL at 06:48

## 2025-04-04 RX ADMIN — Medication 40 MILLIGRAM(S): at 05:48

## 2025-04-04 RX ADMIN — OXYCODONE HYDROCHLORIDE 5 MILLIGRAM(S): 30 TABLET ORAL at 18:20

## 2025-04-04 RX ADMIN — METOPROLOL SUCCINATE 25 MILLIGRAM(S): 50 TABLET, EXTENDED RELEASE ORAL at 18:21

## 2025-04-04 RX ADMIN — Medication 63.75 MILLIMOLE(S): at 13:00

## 2025-04-04 RX ADMIN — LETROZOLE 2.5 MILLIGRAM(S): 2.5 TABLET, FILM COATED ORAL at 12:15

## 2025-04-04 RX ADMIN — AMPICILLIN SODIUM AND SULBACTAM SODIUM 200 GRAM(S): 1; .5 INJECTION, POWDER, FOR SOLUTION INTRAMUSCULAR; INTRAVENOUS at 12:15

## 2025-04-04 RX ADMIN — Medication 1 APPLICATION(S): at 13:08

## 2025-04-04 RX ADMIN — AMLODIPINE BESYLATE 5 MILLIGRAM(S): 10 TABLET ORAL at 05:42

## 2025-04-04 RX ADMIN — DULOXETINE 30 MILLIGRAM(S): 20 CAPSULE, DELAYED RELEASE ORAL at 12:15

## 2025-04-04 RX ADMIN — AMPICILLIN SODIUM AND SULBACTAM SODIUM 200 GRAM(S): 1; .5 INJECTION, POWDER, FOR SOLUTION INTRAMUSCULAR; INTRAVENOUS at 05:42

## 2025-04-04 RX ADMIN — OXYCODONE HYDROCHLORIDE 5 MILLIGRAM(S): 30 TABLET ORAL at 07:48

## 2025-04-04 NOTE — PROGRESS NOTE ADULT - REASON FOR ADMISSION
MRONJ
Submandibular cellulitis

## 2025-04-04 NOTE — PROGRESS NOTE ADULT - SUBJECTIVE AND OBJECTIVE BOX
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    PAST MEDICAL & SURGICAL HISTORY:  Breast cancer  HTN (hypertension)  CAD (coronary artery disease)    Allergies  Vicodin (Unknown)  Intolerances    Physical Exam:   General: AAOx3, NAD  H: Normocephalic             Maxillofacial: TMJ, ARMIDA 40mm, LAD-ve, inferior border of mandible palpable b/l, submental region of face with erythematous, with dressing intact             Intraoral: FOM soft/NT/NE, no draining intraoral purulence, Partially edentulous maxillary and mandibular dentition, no active infections, mucosa pink, moist and non inflamed, multiple fixed prosthesis  E: PERRLA, EOMI  E: Hearing grossly intact, no otorrhea  N: No septal hematoma, no crepitus, no epistaxis  T: Trachea midline  Neuro: No CN V1-3 or VII neuro deficits.     Vitals:     Vital Signs Last 24 Hrs  T(C): 36.4 (04 Apr 2025 05:40), Max: 36.6 (03 Apr 2025 23:15)  T(F): 97.6 (04 Apr 2025 05:40), Max: 97.8 (03 Apr 2025 23:15)  HR: 69 (04 Apr 2025 05:40) (63 - 81)  BP: 143/70 (04 Apr 2025 05:40) (116/56 - 143/70)  BP(mean): --  RR: 18 (04 Apr 2025 05:40) (17 - 18)  SpO2: 100% (04 Apr 2025 05:40) (100% - 100%)    Parameters below as of 04 Apr 2025 05:40  Patient On (Oxygen Delivery Method): room air        I&O's Detail      Medications:    MEDICATIONS  (STANDING):  amLODIPine   Tablet 5 milliGRAM(s) Oral daily  ampicillin/sulbactam  IVPB 3 Gram(s) IV Intermittent every 6 hours  atorvastatin 20 milliGRAM(s) Oral at bedtime  cholecalciferol 1000 Unit(s) Oral daily  cyanocobalamin 1000 MICROGram(s) Oral daily  DULoxetine 30 milliGRAM(s) Oral daily  letrozole 2.5 milliGRAM(s) Oral daily  metoprolol tartrate 25 milliGRAM(s) Oral every 6 hours  morphine  - Injectable 1 milliGRAM(s) IV Push once  pantoprazole  Injectable 40 milliGRAM(s) IV Push every 12 hours    MEDICATIONS  (PRN):  aluminum hydroxide/magnesium hydroxide/simethicone Suspension 30 milliLiter(s) Oral every 4 hours PRN Dyspepsia  melatonin 3 milliGRAM(s) Oral at bedtime PRN Insomnia  ondansetron Injectable 4 milliGRAM(s) IV Push every 8 hours PRN Nausea and/or Vomiting  oxyCODONE    IR 5 milliGRAM(s) Oral every 6 hours PRN for severe pain      Labs:                          7.6    2.65  )-----------( 155      ( 04 Apr 2025 05:48 )             23.6       04-04    140  |  106  |  3[L]  ----------------------------<  98  5.0   |  18[L]  |  0.67    Ca    7.5[L]      04 Apr 2025 05:48  Phos  2.2     04-04  Mg     2.10     04-04

## 2025-04-04 NOTE — PROGRESS NOTE ADULT - ASSESSMENT
80 y/o F with PMHx of metastatic breast cancer (follows with [facility]), HTN, CAD (s/p 1 stent in 2013) presented to the ED at [facility] complaining of 1 week of dyspnea along with several days of submandibular tenderness and erythema. She stated that she has been experiencing dyspnea at rest for 1 week with worsening dyspnea on exertion. She also has been experiencing dental pain and increasing tenderness in the submandibular region with overlying erythema.  ID was consulted and recommended transfer to tertiary facility for possible surgical intervention as she has a large submandibular abscess/furuncle of dental origin and to continue Zosyn and vancomycin. Case was discussed with [facility] and ENT (Dr. Dalal) will evaluate the patient. She was discharged from [facility] on 03/31/2025. She will be admitted for management of submandibular cellulitis and for evaluation by ID and ENT.

## 2025-04-04 NOTE — PROGRESS NOTE ADULT - TIME BILLING
Patient encounter, including chart review, medication review, patient interview, ordering labs and medications, interpreting labs and imaging results, coordination of care with consultants, and discussing plan with patient, family at bedside, and healthcare team.
Patient encounter, including chart review, medication review, patient interview, ordering labs and medications, interpreting labs and imaging results, coordination of care with consultants, and discussing plan with patient and healthcare team.
Patient encounter, including chart review, medication review, patient interview, ordering labs and medications, interpreting labs and imaging results, coordination of care with consultants, and discussing plan with patient, daughter and granddaughter, and healthcare team.
Patient encounter, including chart review, medication review, patient interview, ordering labs and medications, interpreting labs and imaging results, coordination of care with consultants, and discussing plan with patient, 2 family members at bedside, and healthcare team.

## 2025-04-04 NOTE — ADVANCED PRACTICE NURSE CONSULT - REASON FOR CONSULT
Patient seen on skin care rounds after wound care referral received for assessment of skin impairment and recommendations of topical management of submandibular wound at request of OMFS team. As per H&P, patient is a 80 y/o F with PMHx of metastatic breast cancer (follows with [facility]), HTN, CAD (s/p 1 stent in 2013) presented to the ED at [facility] complaining of 1 week of dyspnea along with several days of submandibular tenderness and erythema. Chart reviewed: WBC 2.65, H/H 7.6/23.6, Platelets 155, INR 1.33, Serum albumin 3.0, BMI 31.4, Frank 19.

## 2025-04-04 NOTE — PROGRESS NOTE ADULT - SUBJECTIVE AND OBJECTIVE BOX
PROGRESS NOTE:   Authored by Annika Gayle Ma, MD  Available on MS Teams; Pager [phone]    Patient is a 79y old  Female who presents with a chief complaint of Submandibular cellulitis (04 Apr 2025 13:51)      SUBJECTIVE / OVERNIGHT EVENTS: Pt denies any new or acute symptoms this AM. Requesting regular diet because pain has improved, family states they only requested soft/bite sized diet for that reason, no concerns for aspiration or difficulty chewing at home. Pt states she has had no further episodes of dark stool, however also has not had a BM.    All other review of systems is negative unless indicated above.    MEDICATIONS  (STANDING):  amLODIPine   Tablet 5 milliGRAM(s) Oral daily  ampicillin/sulbactam  IVPB 3 Gram(s) IV Intermittent every 6 hours  atorvastatin 20 milliGRAM(s) Oral at bedtime  chlorhexidine 2% Cloths 1 Application(s) Topical daily  cholecalciferol 1000 Unit(s) Oral daily  cyanocobalamin 1000 MICROGram(s) Oral daily  DULoxetine 30 milliGRAM(s) Oral daily  letrozole 2.5 milliGRAM(s) Oral daily  metoprolol tartrate 25 milliGRAM(s) Oral every 6 hours  morphine  - Injectable 1 milliGRAM(s) IV Push once  pantoprazole  Injectable 40 milliGRAM(s) IV Push every 12 hours    MEDICATIONS  (PRN):  aluminum hydroxide/magnesium hydroxide/simethicone Suspension 30 milliLiter(s) Oral every 4 hours PRN Dyspepsia  melatonin 3 milliGRAM(s) Oral at bedtime PRN Insomnia  ondansetron Injectable 4 milliGRAM(s) IV Push every 8 hours PRN Nausea and/or Vomiting  oxyCODONE    IR 5 milliGRAM(s) Oral every 6 hours PRN for severe pain      CAPILLARY BLOOD GLUCOSE        I&O's Summary      PHYSICAL EXAM:  Vital Signs Last 24 Hrs  T(C): 36.3 (04 Apr 2025 11:12), Max: 36.6 (03 Apr 2025 23:15)  T(F): 97.3 (04 Apr 2025 11:12), Max: 97.8 (03 Apr 2025 23:15)  HR: 69 (04 Apr 2025 11:12) (69 - 81)  BP: 107/56 (04 Apr 2025 11:12) (107/56 - 143/70)  BP(mean): --  RR: 17 (04 Apr 2025 11:12) (17 - 18)  SpO2: 98% (04 Apr 2025 11:12) (98% - 100%)    Parameters below as of 04 Apr 2025 11:12  Patient On (Oxygen Delivery Method): room air        GENERAL: No acute distress  HEAD:  Normocephalic  EYES: Conjunctiva and sclera clear  ENT: Submandibular region with overlying dressing  NECK: Supple  CHEST/LUNG: CTAB  HEART: Regular rate and rhythm  ABDOMEN: Soft, non-tender, non-distended  EXTREMITIES: No clubbing, cyanosis, or edema  NEUROLOGY: A&O x 3  SKIN: No rashes or lesions to visible skin    LABS:                        7.6    2.65  )-----------( 155      ( 04 Apr 2025 05:48 )             23.6     04-04    140  |  106  |  3[L]  ----------------------------<  98  5.0   |  18[L]  |  0.67    Ca    7.5[L]      04 Apr 2025 05:48  Phos  2.2     04-04  Mg     2.10     04-04            Urinalysis Basic - ( 04 Apr 2025 05:48 )    Color: x / Appearance: x / SG: x / pH: x  Gluc: 98 mg/dL / Ketone: x  / Bili: x / Urobili: x   Blood: x / Protein: x / Nitrite: x   Leuk Esterase: x / RBC: x / WBC x   Sq Epi: x / Non Sq Epi: x / Bacteria: x        Culture - Wound Aerobic/Anaerobic (collected 02 Apr 2025 19:12)  Source: Drainage Drainage  Preliminary Report (04 Apr 2025 08:17):    No growth to date.    Culture - Wound Aerobic/Anaerobic (collected 02 Apr 2025 18:21)  Source: Skin/Wound Skin/Wound  Preliminary Report (04 Apr 2025 08:41):    No growth to date.    Culture - Stool (collected 02 Apr 2025 09:25)  Source: Stool Feces  Final Report (04 Apr 2025 12:48):    Moderate Yeast isolated    No enteric gram negative rods isolated    No enteric pathogens isolated.    (Stool culture examined for Salmonella,    Shigella, Campylobacter, Aeromonas, Plesiomonas,    Vibrio, E.coli O157 and Yersinia)          RADIOLOGY & ADDITIONAL TESTS: Reviewed

## 2025-04-04 NOTE — PROGRESS NOTE ADULT - PROBLEM SELECTOR PLAN 8
DVT prophylaxis: SCDs  Diet: DASH/TLC  Code: Full  Disposition: Pending wound care teaching, anticipate home tomorrow
DVT prophylaxis: SCDs  Diet: DASH/TLC, soft and bite sized  Code: Full  Disposition: Pending I&D and final abx plan
DVT prophylaxis: SCDs  Diet: DASH/TLC, soft and bite sized  Code: Full  Disposition: Pending cultures and final abx plan
DVT prophylaxis: Hold Eliquis pending possible intervention by ENT (last dose on 03/31/25 around 5:30 PM)  GI prophylaxis: Protonix BID  Diet: DASH, soft bite sized  Code: Full  Disposition: Pending clinical course

## 2025-04-04 NOTE — DISCHARGE NOTE PROVIDER - HOSPITAL COURSE
HPI:  80 y/o F with PMHx of metastatic breast cancer (follows with [facility]), HTN, CAD (s/p 1 stent in 2013) presented to the ED at [facility] complaining of 1 week of dyspnea along with several days of submandibular tenderness and erythema. She stated that she has been experiencing dyspnea at rest for 1 week with worsening dyspnea on exertion. She also has been experiencing dental pain and increasing tenderness in the submandibular region with overlying erythema. Dental pain was 5/10 and well controlled with Tylenol at home. She denied fevers/chills or recent dental procedure. She also has occasional leg swelling for which she was briefly taking Lasix for but stopped due to low sodium. In [place] ED patient was noted to be in AF with RVR and received Lopressor which slowed down her HR and eventually patient spontaneously went back into NSR. She was evaluated by Cardiology at [place] who recommended to monitor and obtain TTE. She also had an OMKAR and hyponatremia for which she was treated with gentle IVF. She was also evaluated by GI at [place] due to 1 month history of black stool and noted to be anemic during admission. She was started on Protonix BID and GI recommended eventual EGD and Colonoscopy to rule out PUD and neoplasia but that submandibular infection should be evaluated first. She was also started on vancomycin and Zosyn for management of submandibular cellulitis. She was noted to have an open wound in the submandibular region with several small openings with purulent drainage. ID was consulted and recommended transfer to tertiary facility for possible surgical intervention as she has a large submandibular abscess/furuncle of dental origin and to continue Zosyn and vancomycin.   Case was discussed with [facility] and ENT (Dr. Dalal) will evaluate the patient. She was discharged from [facility] on 03/31/2025.      (31 Mar 2025 23:46)    Hospital Course:  Problem/Plan - 1:  ·  Problem: Cellulitis of submandibular region.   ·  Plan: -3/28/25 CT soft tissue neck w/: submandibular space superficial and deep infiltration suggestive of cellulitis with no abscess, asymmetric fullness left aryepiglottic fold and diffuse sclerotic metastases.   ESR: 70, CRP: 285  Lactate: 3.40->2.00  Procalcitonin: 1.14   Blood cultures (03/28/25) NGTD x 4 days  Wound culture (03/29/25) growth with few Parvimonas micra and Peptostreptococcus species  ENT, OMFS, and ID consults appreciated  -s/p I&D on 4/2 with OMFS, ENT now signed off  -Vanc/zosyn -> Unasyn on 4/2 -> dc on augmentin BID     Problem/Plan - 2:  ·  Problem: Atrial fibrillation.   ·  Plan: -Pt with new AF with RVR at [facility], now resolved  -TSH WNL  -Evaluated by Cardiology who recommended to continue Eliquis   -CHADsVASc score of 3  -Per risks vs benefits discussion with pt and family at bedside given melena, anemia, and new AF, informed decision made to discontinue eliquis at this time.     Problem/Plan - 3:  ·  Problem: Breast cancer.   ·  Plan: -Hx of stage IV breast cancer. ER+/DE+/Her2 negative with metastases to the bones, ?colon on Ibrance/Femara since 11/2021 now with L3 lesion on Xgeva  -Follows with Dr. Bam Cunha at [facility]  -CTA chest (03/28/2025) demonstrated no acute PE, diffuse osteosclerotic metastatic disease, 4 x 2.6 cm mass inferior quadrant left breast containing coarse calcifications and 0.7 cm short axis left axillary LN  -Recommended to hold Ibrance during hospitalization as per Oncology recommendations   -Continue Letrozole 2.5 mg daily.     Problem/Plan - 4:  ·  Problem: HTN (hypertension).   ·  Plan: -Continue home amlodipine 5mg qd.     Problem/Plan - 5:  ·  Problem: Anemia.   ·  Plan: -Pt reports 1 month history of 'black stool'. Evaluated by GI at [facility] who recommended EGD/Colonoscopy once submandibular cellulitis is treated and to continue Protonix BID   -Last episode of melena 4/1. FOBT+  -Hgb: 9.9 -> 8.0, stable between 7-8  -Labs not c/w MERI, B12 or folate deficiency  -Continue IV PPI BID  -Transfuse if Hgb<8 g/dL - multiple discussions with pt- currently declining a blood transfusion, states she is open to reconsidering if she is actively bleeding or symptomatic  -Maintain active type and screen   -CBC daily  -GI consulted- endoscopy deferred for now, recommending outpt vs inpt when medically optimized.     Problem/Plan - 6:  ·  Problem: Hyponatremia.   ·  Plan: -Mild hyponatremia to 132 upon transfer  -Resolved    #Hypophosphatemia  Likely due to poor PO intake  Given IV and PO repletion     Problem/Plan - 7:  ·  Problem: CAD (coronary artery disease).   ·  Plan: -s/p 1 stent in 2013  -Held ASA inpatient  -Continue atorvastatin 20mg qd. HPI:  80 y/o F with PMHx of metastatic breast cancer (follows with [facility]), HTN, CAD (s/p 1 stent in 2013) presented to the ED at [facility] complaining of 1 week of dyspnea along with several days of submandibular tenderness and erythema. She stated that she has been experiencing dyspnea at rest for 1 week with worsening dyspnea on exertion. She also has been experiencing dental pain and increasing tenderness in the submandibular region with overlying erythema. Dental pain was 5/10 and well controlled with Tylenol at home. She denied fevers/chills or recent dental procedure. She also has occasional leg swelling for which she was briefly taking Lasix for but stopped due to low sodium. In [place] ED patient was noted to be in AF with RVR and received Lopressor which slowed down her HR and eventually patient spontaneously went back into NSR. She was evaluated by Cardiology at [place] who recommended to monitor and obtain TTE. She also had an OMKAR and hyponatremia for which she was treated with gentle IVF. She was also evaluated by GI at [place] due to 1 month history of black stool and noted to be anemic during admission. She was started on Protonix BID and GI recommended eventual EGD and Colonoscopy to rule out PUD and neoplasia but that submandibular infection should be evaluated first. She was also started on vancomycin and Zosyn for management of submandibular cellulitis. She was noted to have an open wound in the submandibular region with several small openings with purulent drainage. ID was consulted and recommended transfer to tertiary facility for possible surgical intervention as she has a large submandibular abscess/furuncle of dental origin and to continue Zosyn and vancomycin.   Case was discussed with [facility] and ENT (Dr. Dalal) will evaluate the patient. She was discharged from [facility] on 03/31/2025.      (31 Mar 2025 23:46)    Hospital Course:  Problem/Plan - 1:  ·  Problem: Cellulitis of submandibular region.   ·  Plan: -3/28/25 CT soft tissue neck w/: submandibular space superficial and deep infiltration suggestive of cellulitis with no abscess, asymmetric fullness left aryepiglottic fold and diffuse sclerotic metastases.   ESR: 70, CRP: 285  Lactate: 3.40->2.00  Procalcitonin: 1.14   Blood cultures (03/28/25) NGTD x 4 days  Wound culture (03/29/25) growth with few Parvimonas micra and Peptostreptococcus species  ENT, OMFS, and ID consults appreciated  -s/p I&D on 4/2 with OMFS, ENT now signed off  -Vanc/zosyn -> Unasyn on 4/2 -> dc on augmentin BID until 4/10  Provided wound care teaching prior to DC     Problem/Plan - 2:  ·  Problem: Atrial fibrillation.   ·  Plan: -Pt with new AF with RVR at [facility], now resolved  -TSH WNL  -Evaluated by Cardiology who recommended to continue Eliquis   -CHADsVASc score of 3  -Per risks vs benefits discussion with pt and family at bedside given melena, anemia, and new AF, informed decision made to discontinue eliquis at this time.     Problem/Plan - 3:  ·  Problem: Breast cancer.   ·  Plan: -Hx of stage IV breast cancer. ER+/FL+/Her2 negative with metastases to the bones, ?colon on Ibrance/Femara since 11/2021 now with L3 lesion on Xgeva  -Follows with Dr. Bam Cunha at [facility]  -CTA chest (03/28/2025) demonstrated no acute PE, diffuse osteosclerotic metastatic disease, 4 x 2.6 cm mass inferior quadrant left breast containing coarse calcifications and 0.7 cm short axis left axillary LN  -Recommended to hold Ibrance during hospitalization as per Oncology recommendations   -Continue Letrozole 2.5 mg daily.     Problem/Plan - 4:  ·  Problem: HTN (hypertension).   ·  Plan: -Continue home amlodipine 5mg qd.     Problem/Plan - 5:  ·  Problem: Anemia.   ·  Plan: -Pt reports 1 month history of 'black stool'. Evaluated by GI at [facility] who recommended EGD/Colonoscopy once submandibular cellulitis is treated and to continue Protonix BID   -Last episode of melena 4/1. FOBT+  -Hgb: 9.9 -> 8.0, stable between 7-8  -Labs not c/w MERI, B12 or folate deficiency  -Continue IV PPI BID  -Transfuse if Hgb<8 g/dL - multiple discussions with pt- currently declining a blood transfusion, states she is open to reconsidering if she is actively bleeding or symptomatic  -Maintain active type and screen   -CBC daily  -GI consulted- endoscopy deferred for now, recommending outpt vs inpt when medically optimized.     Problem/Plan - 6:  ·  Problem: Hyponatremia.   ·  Plan: -Mild hyponatremia to 132 upon transfer  -Resolved    #Hypophosphatemia  Likely due to poor PO intake  Given IV and PO repletion     Problem/Plan - 7:  ·  Problem: CAD (coronary artery disease).   ·  Plan: -s/p 1 stent in 2013  -Held ASA inpatient, resume on dc. D/w pt and family to monitor for bleeding  -Continue atorvastatin 20mg qd.

## 2025-04-04 NOTE — DISCHARGE NOTE PROVIDER - NSDCFUADDAPPT_GEN_ALL_CORE_FT
APPTS ARE READY TO BE MADE: [ ] YES    Best Family or Patient Contact (if needed):    Additional Information about above appointments (if needed):    1:   2:   3:     Other comments or requests:    APPTS ARE READY TO BE MADE: [X] YES    Best Family or Patient Contact (if needed):    Additional Information about above appointments (if needed):    1: PCP in 1 week for safe transitions of care and repeat CBC, BMP  2: Cardiology in 1-2 weeks for management of new Afib  3: GI in 1-2 weeks for anemia, possible endoscopy  4: OMFS for follow up s/p I&D  5: Oncology to discuss when to restart ibrance, breast cancer f/u    Other comments or requests:

## 2025-04-04 NOTE — PROGRESS NOTE ADULT - NS ATTEND AMEND GEN_ALL_CORE FT
OMFS following. plan for ID  monitor cbc   will cont to follow
cont to follow up with OMFS and ID  hold palbociclib for now
Agree with above assessment and plan.   79 year old female with metastatic breast cancer on palbociclib and letrozole here with ONJ s/p debridement on antibiotics. Agree with continuing letrozole and holding palbociclib in the setting of infection. Follow up with Dr Pal at [facility] after discharge.

## 2025-04-04 NOTE — PROGRESS NOTE ADULT - PROBLEM SELECTOR PLAN 2
-Pt with new AF with RVR at [facility], now resolved  -TSH WNL  -Evaluated by Cardiology who recommended to continue Eliquis   -CHADsVASc score of 3    -Per risks vs benefits discussion with pt and family at bedside given melena, anemia, and new AF, informed decision made to discontinue eliquis at this time. Advised to follow up outpatient with her Cardiologist

## 2025-04-04 NOTE — PROGRESS NOTE ADULT - PROBLEM SELECTOR PLAN 5
-Pt reports 1 month history of 'black stool'. Evaluated by GI at [facility] who recommended EGD/Colonoscopy once submandibular cellulitis is treated and to continue Protonix BID   -Last episode of melena 4/1. FOBT+  -Hgb: 9.9 -> 8.0, currently stable between 7-8  -Labs not c/w MERI, B12 or folate deficiency    -Continue IV PPI BID  -Transfuse if Hgb<8 g/dL - multiple discussions with pt- currently declining a blood transfusion, states she is open to reconsidering if she is actively bleeding or symptomatic  -Maintain active type and screen   -CBC daily  -GI consulted- endoscopy deferred for now, recommending outpt vs inpt when medically optimized. Pt and family deferring endoscopy here, outpatient f/u with GI

## 2025-04-04 NOTE — PROGRESS NOTE ADULT - PROBLEM SELECTOR PROBLEM 7
CAD (coronary artery disease)
250 feet

## 2025-04-04 NOTE — PROGRESS NOTE ADULT - SUBJECTIVE AND OBJECTIVE BOX
Follow Up:      Inverval History/ROS:Patient is a 79y old  Female who presents with a chief complaint of Submandibular cellulitis (04 Apr 2025 09:25)    No fever  No events    Allergies    Vicodin (Unknown)    Intolerances        ANTIMICROBIALS:  ampicillin/sulbactam  IVPB 3 every 6 hours      OTHER MEDS:  aluminum hydroxide/magnesium hydroxide/simethicone Suspension 30 milliLiter(s) Oral every 4 hours PRN  amLODIPine   Tablet 5 milliGRAM(s) Oral daily  atorvastatin 20 milliGRAM(s) Oral at bedtime  chlorhexidine 2% Cloths 1 Application(s) Topical daily  cholecalciferol 1000 Unit(s) Oral daily  cyanocobalamin 1000 MICROGram(s) Oral daily  DULoxetine 30 milliGRAM(s) Oral daily  letrozole 2.5 milliGRAM(s) Oral daily  melatonin 3 milliGRAM(s) Oral at bedtime PRN  metoprolol tartrate 25 milliGRAM(s) Oral every 6 hours  morphine  - Injectable 1 milliGRAM(s) IV Push once  ondansetron Injectable 4 milliGRAM(s) IV Push every 8 hours PRN  oxyCODONE    IR 5 milliGRAM(s) Oral every 6 hours PRN  pantoprazole  Injectable 40 milliGRAM(s) IV Push every 12 hours  sodium phosphate 15 milliMole(s)/250 mL IVPB 15 milliMole(s) IV Intermittent once      Vital Signs Last 24 Hrs  T(C): 36.3 (04 Apr 2025 11:12), Max: 36.6 (03 Apr 2025 23:15)  T(F): 97.3 (04 Apr 2025 11:12), Max: 97.8 (03 Apr 2025 23:15)  HR: 69 (04 Apr 2025 11:12) (63 - 81)  BP: 107/56 (04 Apr 2025 11:12) (107/56 - 143/70)  BP(mean): --  RR: 17 (04 Apr 2025 11:12) (17 - 18)  SpO2: 98% (04 Apr 2025 11:12) (98% - 100%)    Parameters below as of 04 Apr 2025 11:12  Patient On (Oxygen Delivery Method): room air        PHYSICAL EXAM:  General: [ ]x non-toxic  HEAD/EYES: [ ] PERRL [x ] white sclera [ ] icterus  ENT:  [ ] normal [x ] supple [ ] thrush [ ] pharyngeal exudate  Cardiovascular:   [ ] murmur [ ] normal [ ] PPM/AICD  Respiratory:  [ ] clear to ausculation bilaterally  GI:  [ x] soft, non-tender, normal bowel sounds  :  [ ] goldberg [ ] no CVA tenderness   Musculoskeletal:  [ ] no synovitis  Neurologic:  [ ] non-focal exam   Skin:  [x ] no rash  Lymph: [ ] no lymphadenopathy  Psychiatric:  [ x] appropriate affect [ ] alert & oriented  Lines:  [ x] no phlebitis [ ] central line                                7.6    2.65  )-----------( 155      ( 04 Apr 2025 05:48 )             23.6       04-04    140  |  106  |  3[L]  ----------------------------<  98  5.0   |  18[L]  |  0.67    Ca    7.5[L]      04 Apr 2025 05:48  Phos  2.2     04-04  Mg     2.10     04-04        Urinalysis Basic - ( 04 Apr 2025 05:48 )    Color: x / Appearance: x / SG: x / pH: x  Gluc: 98 mg/dL / Ketone: x  / Bili: x / Urobili: x   Blood: x / Protein: x / Nitrite: x   Leuk Esterase: x / RBC: x / WBC x   Sq Epi: x / Non Sq Epi: x / Bacteria: x        MICROBIOLOGY:Culture Results:   No growth to date. (04-02-25 @ 19:12)  Culture Results:   No growth to date. (04-02-25 @ 18:21)  Culture Results:   Moderate Yeast isolated  No enteric gram negative rods isolated  No enteric pathogens to date: Final culture pending (04-02-25 @ 09:25)      RADIOLOGY:     Follow Up:      Inverval History/ROS:Patient is a 79y old  Female who presents with a chief complaint of Submandibular cellulitis (04 Apr 2025 09:25)    No fever  No events    Allergies    Vicodin (Unknown)    Intolerances        ANTIMICROBIALS:  ampicillin/sulbactam  IVPB 3 every 6 hours      OTHER MEDS:  aluminum hydroxide/magnesium hydroxide/simethicone Suspension 30 milliLiter(s) Oral every 4 hours PRN  amLODIPine   Tablet 5 milliGRAM(s) Oral daily  atorvastatin 20 milliGRAM(s) Oral at bedtime  chlorhexidine 2% Cloths 1 Application(s) Topical daily  cholecalciferol 1000 Unit(s) Oral daily  cyanocobalamin 1000 MICROGram(s) Oral daily  DULoxetine 30 milliGRAM(s) Oral daily  letrozole 2.5 milliGRAM(s) Oral daily  melatonin 3 milliGRAM(s) Oral at bedtime PRN  metoprolol tartrate 25 milliGRAM(s) Oral every 6 hours  morphine  - Injectable 1 milliGRAM(s) IV Push once  ondansetron Injectable 4 milliGRAM(s) IV Push every 8 hours PRN  oxyCODONE    IR 5 milliGRAM(s) Oral every 6 hours PRN  pantoprazole  Injectable 40 milliGRAM(s) IV Push every 12 hours  sodium phosphate 15 milliMole(s)/250 mL IVPB 15 milliMole(s) IV Intermittent once      Vital Signs Last 24 Hrs  T(C): 36.3 (04 Apr 2025 11:12), Max: 36.6 (03 Apr 2025 23:15)  T(F): 97.3 (04 Apr 2025 11:12), Max: 97.8 (03 Apr 2025 23:15)  HR: 69 (04 Apr 2025 11:12) (63 - 81)  BP: 107/56 (04 Apr 2025 11:12) (107/56 - 143/70)  BP(mean): --  RR: 17 (04 Apr 2025 11:12) (17 - 18)  SpO2: 98% (04 Apr 2025 11:12) (98% - 100%)    Parameters below as of 04 Apr 2025 11:12  Patient On (Oxygen Delivery Method): room air        PHYSICAL EXAM:  General: [ ]x non-toxic  HEAD/EYES: [ ] PERRL [x ] white sclera [ ] icterus  ENT:  [ ] normal [x ] supple [ ] thrush [ ] pharyngeal exudate  Cardiovascular:   [ ] murmur [ ] normal [ ] PPM/AICD  Respiratory:  [ ] clear to ausculation bilaterally  GI:  [ x] soft, non-tender, normal bowel sounds  :  [ ] goldberg [ ] no CVA tenderness   Musculoskeletal:  [ ] no synovitis  Neurologic:  [ ] non-focal exam   Skin:  [x ] no rash  Lymph: [ ] no lymphadenopathy  Psychiatric:  [ x] appropriate affect [ ] alert & oriented  Lines:  [ x] no phlebitis [ ] central line                                7.6    2.65  )-----------( 155      ( 04 Apr 2025 05:48 )             23.6       04-04    140  |  106  |  3[L]  ----------------------------<  98  5.0   |  18[L]  |  0.67    Ca    7.5[L]      04 Apr 2025 05:48  Phos  2.2     04-04  Mg     2.10     04-04        Urinalysis Basic - ( 04 Apr 2025 05:48 )    Color: x / Appearance: x / SG: x / pH: x  Gluc: 98 mg/dL / Ketone: x  / Bili: x / Urobili: x   Blood: x / Protein: x / Nitrite: x   Leuk Esterase: x / RBC: x / WBC x   Sq Epi: x / Non Sq Epi: x / Bacteria: x        MICROBIOLOGY:Culture Results:   No growth to date. (04-02-25 @ 19:12)  Culture Results:   No growth to date. (04-02-25 @ 18:21)  Culture Results:   Moderate Yeast isolated  No enteric gram negative rods isolated  No enteric pathogens to date: Final culture pending (04-02-25 @ 09:25)      RADIOLOGY:          ACC: [id number]      EXAM: CAT 5006 _ [place] [id number]      PROCEDURE DATE:  Mar 28 2025      CT neck with IV contrast    CLINICAL INFORMATION:    submandibular swelling, abscess, hematoma    TECHNIQUE:  Contiguous axial 2 mm thick sections were obtained through the  neck using single helical acquisition.  Images were acquired during the  intravenous administration of nonionic contrast.  Image data was  reconstructed in the coronal and sagittal planes.  Oblique axial images were  obtained at minimal delay angled to avoid dental amalgam.  CONTRAST:   IV contrast documented in unlinked concurrent exam: ;  DOSE INFORMATION:   This scan was performed using automatic exposure control  (radiation dose reduction software) to obtain a diagnostic image quality  scan with patient dose as low as reasonably achievable.   Total DLP for this  examination is estimated at 669 mGy-cm.    COMPARISON:   None    FINDINGS:    SOFT TISSUES  There is considerable infiltration within the submandibular space. This  includes skin thickening indirectly subcutaneous infiltration. There is  asymmetric thickening of the platysma on the left. This infiltration extends  deep to the platysma to the undersurface of the mandible. No well-defined  superficial soft tissue mass or fluid collection is found.    AERODIGESTIVE TRACT  The mucosal surfaces of the upper aerodigestive tract demonstrate asymmetric  fullness at the left aryepiglottic fold. No discrete mucosal or submucosal  lesion is recognized..  The nasopharynx is symmetric.  The larynx is intact.   The preepiglottic and paralaryngeal spaces are intact.  Laryngeal  cartilages remain intact.    LYMPH NODES  No pathologically enlarged lymph nodes are found.   The visualized lymph  nodes demonstrate no central necrosis or extranodal extension. No  differential lymph node hyper enhancement is appreciated.   No lateral  retropharyngeal mass is found.    MAJOR SALIVARY GLANDS:  The parotid and submandibular glands appear intact.  THYROID GLAND:    Intact  VISUALIZED PARANASAL SINUSES:   Clear  NASAL CAVITY:   The nasal septum deviates right to left.  FACIAL BONES:  No fracture is identified. No destructive bone lesion is  identified.  SKULL BASE:    The central skull base, petrous temporal bones and visualized  base of brain appears intact.    CERVICAL SPINE:   The visualized osseous structures including the cervical  spine, skull base ribs and sternum demonstrate a generalized pattern of  patchy sclerosis. This does not appear to be associated with significant  osseous expansion. No pathologic fracture is recognized. Degenerative  features compromise of ventral thecal sac. There are osteoarthritic changes  at the articulation of the anterior ring of C1 and the odontoid process of  C2.  This arthropathy includes marginal osteophytes, subchondral sclerosis  and joint space narrowing.  VASCULATURE:   The carotid and vertebral arteries demonstrate enhancement  consistent with their patency.   Internal jugular veins are patent.    UPPER CHEST:   The visualized lung apices are clear, allowing for the neck  CT technique.      IMPRESSION:    1. Submandibular space superficial and deep infiltration suggests an active  inflammatory process such as cellulitis. No abscess is recognized    2. Asymmetric fullness left aryepiglottic fold, nonspecific    3. Diffuse sclerotic metastases    --- End of Report ---              ISAIAH LÓPEZ MD;Attending Radiologist  This document has been electronically signed. Mar 28 2025  3:58PM

## 2025-04-04 NOTE — PROGRESS NOTE ADULT - PROBLEM SELECTOR PROBLEM 1
Cellulitis of submandibular region

## 2025-04-04 NOTE — DISCHARGE NOTE PROVIDER - ATTENDING DISCHARGE PHYSICAL EXAMINATION:
GENERAL: No acute distress  HEAD:  Normocephalic  EYES: Conjunctiva and sclera clear  ENT: Submandibular region with overlying dressing  NECK: Supple  CHEST/LUNG: CTAB  HEART: Regular rate and rhythm  ABDOMEN: Soft, non-tender, non-distended  EXTREMITIES:  No clubbing, cyanosis, or edema  NEUROLOGY: A&O x 3  SKIN: No rashes or lesions to visible skin

## 2025-04-04 NOTE — PROGRESS NOTE ADULT - ASSESSMENT
79 year old female with metastatic cancer on treatment  Presented to OSH with SOB/ submandibular swelling with erythema    Imaging with submandibular space collection    Tx for ENT eval    Superficial culture with strep/anaerobes    S/p drainage of the abscess by OMFS    Cultures are without growth    Continue antibiotics through 4/10  Continue Unasyn while admitted  IF stable for dc, can finish course with augmentin 875 mg po q 12      Dental eval per OMFS    Yeast noted in stool- doubt this is a pathogen. More likely a colonizer    ID service to sign off  Call ID service if cultures change or with questions   79 year old female with metastatic cancer on treatment  Presented to OSH with SOB/ submandibular swelling with erythema    Imaging with submandibular space collection    Tx for ENT eval    Superficial culture with strep/anaerobes    S/p drainage of the abscess by OMFS    Cultures are without growth    Concern was raised for osteonecrosis.   If pt with osteonecrosis develop infection, it is secondary to the necrosis    Continue antibiotics through 4/10  Continue Unasyn while admitted  IF stable for dc, can finish course with augmentin 875 mg po q 12    If there is a concern for osteonecrosis, can extend course of antibiotics.     Without a positive culture or more consistent imaging, I think the risk of empiric IV antibiotics for a prolonged course outweighs the benefit at this time.    Dental eval per OMFS      Yeast noted in stool- doubt this is a pathogen. More likely acolonizer    Follow up as an outpatient:  [address]  [place] [address] [phone]     ID service to sign off  Call ID service if cultures change or with questions

## 2025-04-04 NOTE — PROGRESS NOTE ADULT - ASSESSMENT
· Assessment	  79 year old old female with a history of metastatic breast cancer, h/o Xgeva use since 2021, HTN, CAD and Afib  presents to [facility] with stage III MRONJ of submental region now POD2 s/p incision and drainage of affected site on 4/2/25    -will need 2x daily dressing changes to chin wet to dry with saline soaked guaze and dry guaze  -rec ID consult for long term abx/PICC line  -rec Ancef and flagyl  -Multimodal pain management      Greta Stephenson  Oral & Maxillofacial Surgery   #[id number]  Available on Teams

## 2025-04-04 NOTE — DISCHARGE NOTE PROVIDER - NSDCMRMEDTOKEN_GEN_ALL_CORE_FT
amLODIPine 5 mg oral tablet: 1 tab(s) orally once a day  apixaban 5 mg oral tablet: 1 tab(s) orally 2 times a day  atorvastatin 20 mg oral tablet: 1 tab(s) orally once a day (at bedtime)  cholecalciferol 25 mcg (1000 intl units) oral tablet: 1 tab(s) orally once a day  cyanocobalamin: 2,000 microgram(s) orally once a day  DULoxetine 30 mg oral delayed release capsule: 1 cap(s) orally once a day (at bedtime)  letrozole 2.5 mg oral tablet: 1 tab(s) orally once a day  metoprolol tartrate 25 mg oral tablet: 1 tab(s) orally every 6 hours  oxyCODONE 5 mg oral tablet: 1 tab(s) orally every 6 hours as needed for  severe pain  pantoprazole 40 mg intravenous injection: 40 unit(s) intravenously every 12 hours   amLODIPine 5 mg oral tablet: 1 tab(s) orally once a day  amoxicillin-clavulanate 875 mg-125 mg oral tablet: 875 milligram(s) orally 2 times a day  atorvastatin 20 mg oral tablet: 1 tab(s) orally once a day (at bedtime)  cholecalciferol 25 mcg (1000 intl units) oral tablet: 1 tab(s) orally once a day  cyanocobalamin: 2,000 microgram(s) orally once a day  DULoxetine 30 mg oral delayed release capsule: 1 cap(s) orally once a day (at bedtime)  letrozole 2.5 mg oral tablet: 1 tab(s) orally once a day  metoprolol tartrate 25 mg oral tablet: 1 tab(s) orally every 6 hours  oxyCODONE 5 mg oral tablet: 1 tab(s) orally every 6 hours as needed for  severe pain MDD: 4  pantoprazole 40 mg oral delayed release tablet: 1 tab(s) orally 2 times a day

## 2025-04-04 NOTE — ADVANCED PRACTICE NURSE CONSULT - RECOMMEDATIONS
Topical recommendations:     Left submandibular: Cleanse with NS, pat dry. Apply Liquid barrier film to periwound skin (allow to dry). Loosely pack cavity with hydrofiber ribbon (Aquacel) as primary dressing to absorb and fill dead space. Cover with silicone foam with border. Change every other day and PRN if soiled.     Plan of care discussed with patient, family at bedside, and   Extra supplies provided to patient at bedside. Family educated on how to perform wound care. All questions and concerns answered.     Please contact Wound/Ostomy Care Service Line if we can be of further assistance (ext [phone]).  Topical recommendations:     Left submandibular: Cleanse with NS, pat dry. Apply Liquid barrier film to periwound skin (allow to dry). Loosely pack cavity with hydrofiber ribbon (Aquacel) as primary dressing to absorb and fill dead space, leaving ~2 inches out to wick. Cover with silicone foam with border. Change every other day and PRN if soiled.     Plan of care discussed with patient, family at bedside, and   Extra supplies provided to patient at bedside. Family educated on how to perform wound care. All questions and concerns answered.     Please contact Wound/Ostomy Care Service Line if we can be of further assistance (ext [phone]).  Topical recommendations:     Left submandibular: Cleanse with NS, pat dry. Apply Liquid barrier film to periwound skin (allow to dry). Loosely pack cavity with hydrofiber ribbon (Aquacel) as primary dressing to absorb and fill dead space, leaving ~2 inches out to wick. Cover with silicone foam with border. Change every other day and PRN if soiled.     Plan of care discussed with patient, family at bedside, and Teresa Mcduffie (NP).  Extra supplies provided to patient at bedside. Family educated on how to perform wound care. All questions and concerns answered.     Please contact Wound/Ostomy Care Service Line if we can be of further assistance (ext [phone]).

## 2025-04-04 NOTE — PROGRESS NOTE ADULT - PROBLEM SELECTOR PLAN 6
-Mild hyponatremia to 132 upon transfer  -Resolved    #Hypophosphatemia  Likely due to poor PO intake  Replete PRN  Continue to trend

## 2025-04-04 NOTE — DISCHARGE NOTE NURSING/CASE MANAGEMENT/SOCIAL WORK - FINANCIAL ASSISTANCE
[facility] provides services at a reduced cost to those who are determined to be eligible through [facility]’s financial assistance program. Information regarding [facility]’s financial assistance program can be found by going to [email or web address] or by calling [phone].

## 2025-04-04 NOTE — DISCHARGE NOTE NURSING/CASE MANAGEMENT/SOCIAL WORK - PATIENT PORTAL LINK FT
You can access the FollowMyHealth Patient Portal offered by [facility] by registering at the following website: [email or web address]. By joining [facility]’s FollowMyHealth portal, you will also be able to view your health information using other applications (apps) compatible with our system.

## 2025-04-04 NOTE — PROGRESS NOTE ADULT - SUBJECTIVE AND OBJECTIVE BOX
Patient is a 79y old  Female who presents with a chief complaint of Submandibular cellulitis (04 Apr 2025 07:44)        MEDICATIONS  (STANDING):  amLODIPine   Tablet 5 milliGRAM(s) Oral daily  ampicillin/sulbactam  IVPB 3 Gram(s) IV Intermittent every 6 hours  atorvastatin 20 milliGRAM(s) Oral at bedtime  cholecalciferol 1000 Unit(s) Oral daily  cyanocobalamin 1000 MICROGram(s) Oral daily  DULoxetine 30 milliGRAM(s) Oral daily  letrozole 2.5 milliGRAM(s) Oral daily  metoprolol tartrate 25 milliGRAM(s) Oral every 6 hours  morphine  - Injectable 1 milliGRAM(s) IV Push once  pantoprazole  Injectable 40 milliGRAM(s) IV Push every 12 hours  sodium phosphate 15 milliMole(s)/250 mL IVPB 15 milliMole(s) IV Intermittent once    MEDICATIONS  (PRN):  aluminum hydroxide/magnesium hydroxide/simethicone Suspension 30 milliLiter(s) Oral every 4 hours PRN Dyspepsia  melatonin 3 milliGRAM(s) Oral at bedtime PRN Insomnia  ondansetron Injectable 4 milliGRAM(s) IV Push every 8 hours PRN Nausea and/or Vomiting  oxyCODONE    IR 5 milliGRAM(s) Oral every 6 hours PRN for severe pain      ROS  No fever, sweats, chills  No epistaxis, HA, sore throat  No CP, SOB, cough, sputum  No n/v/d, abd pain, melena, hematochezia  No edema  No rash  No anxiety  No back pain, joint pain  No bleeding, bruising  No dysuria, hematuria    Vital Signs Last 24 Hrs  T(C): 36.4 (04 Apr 2025 05:40), Max: 36.6 (03 Apr 2025 23:15)  T(F): 97.6 (04 Apr 2025 05:40), Max: 97.8 (03 Apr 2025 23:15)  HR: 69 (04 Apr 2025 05:40) (63 - 81)  BP: 143/70 (04 Apr 2025 05:40) (116/56 - 143/70)  BP(mean): --  RR: 18 (04 Apr 2025 05:40) (17 - 18)  SpO2: 100% (04 Apr 2025 05:40) (100% - 100%)    Parameters below as of 04 Apr 2025 05:40  Patient On (Oxygen Delivery Method): room air        PE  NAD  Awake, alert  Anicteric, MMM  RRR  CTAB  Abd soft, NT, ND  No c/c/e  No rash grossly  FROM                          7.6    2.65  )-----------( 155      ( 04 Apr 2025 05:48 )             23.6       04-04    140  |  106  |  3[L]  ----------------------------<  98  5.0   |  18[L]  |  0.67    Ca    7.5[L]      04 Apr 2025 05:48  Phos  2.2     04-04  Mg     2.10     04-04         Patient is a 79y old  Female who presents with a chief complaint of Submandibular cellulitis (04 Apr 2025 07:44)  Patient seen today, no complaints        MEDICATIONS  (STANDING):  amLODIPine   Tablet 5 milliGRAM(s) Oral daily  ampicillin/sulbactam  IVPB 3 Gram(s) IV Intermittent every 6 hours  atorvastatin 20 milliGRAM(s) Oral at bedtime  cholecalciferol 1000 Unit(s) Oral daily  cyanocobalamin 1000 MICROGram(s) Oral daily  DULoxetine 30 milliGRAM(s) Oral daily  letrozole 2.5 milliGRAM(s) Oral daily  metoprolol tartrate 25 milliGRAM(s) Oral every 6 hours  morphine  - Injectable 1 milliGRAM(s) IV Push once  pantoprazole  Injectable 40 milliGRAM(s) IV Push every 12 hours  sodium phosphate 15 milliMole(s)/250 mL IVPB 15 milliMole(s) IV Intermittent once    MEDICATIONS  (PRN):  aluminum hydroxide/magnesium hydroxide/simethicone Suspension 30 milliLiter(s) Oral every 4 hours PRN Dyspepsia  melatonin 3 milliGRAM(s) Oral at bedtime PRN Insomnia  ondansetron Injectable 4 milliGRAM(s) IV Push every 8 hours PRN Nausea and/or Vomiting  oxyCODONE    IR 5 milliGRAM(s) Oral every 6 hours PRN for severe pain        Vital Signs Last 24 Hrs  T(C): 36.4 (04 Apr 2025 05:40), Max: 36.6 (03 Apr 2025 23:15)  T(F): 97.6 (04 Apr 2025 05:40), Max: 97.8 (03 Apr 2025 23:15)  HR: 69 (04 Apr 2025 05:40) (63 - 81)  BP: 143/70 (04 Apr 2025 05:40) (116/56 - 143/70)  BP(mean): --  RR: 18 (04 Apr 2025 05:40) (17 - 18)  SpO2: 100% (04 Apr 2025 05:40) (100% - 100%)    Parameters below as of 04 Apr 2025 05:40  Patient On (Oxygen Delivery Method): room air        PE  NAD  Awake, alert  Anicteric, MMM  RRR  CTAB  Abd soft, NT, ND  No c/c/e  No rash grossly                            7.6    2.65  )-----------( 155      ( 04 Apr 2025 05:48 )             23.6       04-04    140  |  106  |  3[L]  ----------------------------<  98  5.0   |  18[L]  |  0.67    Ca    7.5[L]      04 Apr 2025 05:48  Phos  2.2     04-04  Mg     2.10     04-04

## 2025-04-04 NOTE — PROGRESS NOTE ADULT - NS ATTEND BILL GEN_ALL_CORE
Attending to bill
conducted a detailed discussion...
Attending to bill
Attending to bill
I had a detailed discussion with the patient and/or guardian regarding the historical points, exam findings, and any diagnostic results supporting the discharge  diagnosis.

## 2025-04-04 NOTE — PROGRESS NOTE ADULT - ASSESSMENT
This is an 79 year old female with metastatic breast cancer who was transferred here for evaluation of submandibuar wound.    1. Metastatic breast cancer   -- Diagnosed in 2021 via biopsy of iliac bone confirming metastatic breast cancer   -- She has been on palbociclib and letrozole since. Hold palbociclib for now, may continue letrozole inpatient   -- Follow up with Dr. Bam Cunha of [facility] after discharge.    2. Cellulitis   -- Pt transferred from [facility] for evaluation and treatment of cellulitis of submandibular region w possible abscess   -- ENT consulted, defer management to OMFS  --s/p I&D and debridemen 4/2 with OMFS, likely MRONJ, f/u wound culture, 2x culture   -- ID following, continues on Ampicillin  -- Pt has been on monthly Xgeva, last dose 01/29/2025     3. Anemia   -- Baseline hemoglobin outpatient 9-10   -- Iron studies w/o evidence of iron deficiency   -- May be related to palbociclib though pt has been on this since 2021. Likely worsened in setting of GIB and acute infection  -- GI consulted, however pt and family deferring endoscopic evaluation until MRONJ treatment has completed  -- Monitor CBC and transfuse to maintain hg >7    Will continue to follow.    Linnette Harrison NP  Hematology/Oncology  [facility]  [phone] (Office)  [phone] (Alt office)  Evenings and weekends please call MD on call or office

## 2025-04-04 NOTE — DISCHARGE NOTE PROVIDER - NSDCCPCAREPLAN_GEN_ALL_CORE_FT
PRINCIPAL DISCHARGE DIAGNOSIS  Diagnosis: Cellulitis of submandibular region  Assessment and Plan of Treatment: You were transferred to [facility] and evaluated by our ENT, OMFS, and ID specialists. You were started on IV antibiotics and had an incision and drainage procedure done on 4/2. Continue the oral antibiotics on discharge as prescribed. It is very important that you complete the entire course. Continue wound care and follow up with OMFS after you are discharged.      SECONDARY DISCHARGE DIAGNOSES  Diagnosis: Atrial fibrillation  Assessment and Plan of Treatment: You were noted to have an abnormal and fast heart rhythm. You were started on a blood thinner, however had a drop in your blood counts and evidence of blood in the stool. We discussed the risks vs benefits of the blood thinner and you elected to discontinue this given your risks for bleeding.    Diagnosis: Anemia  Assessment and Plan of Treatment: Your hemoglobin levels were stable between 7-8 while you were hospitalized. We recommended a blood transfusion to improve your levels to above 8 given your cardiac history, however you elected to forego a blood transfusion. Follow up with your PCP in 1 week for safe transitions of care to discuss your recent hospitalizations and for repeat bloodwork to make sure your Hb remains stable. Follow up with GI outpatient to discuss having an endosocpy done.    Diagnosis: Breast cancer  Assessment and Plan of Treatment: We held your ibrance in setting of your infection. Continue to hold this until you complete your course of antibiotics. Follow up with your Oncologists.     PRINCIPAL DISCHARGE DIAGNOSIS  Diagnosis: Cellulitis of submandibular region  Assessment and Plan of Treatment: You were transferred to [facility] and evaluated by our ENT, OMFS, and ID specialists. You were started on IV antibiotics and had an incision and drainage procedure done on 4/2. Continue the oral antibiotics on discharge as prescribed. It is very important that you complete the entire course. Continue wound care and follow up with OMFS after you are discharged.  Topical recommendations:   Left submandibular: Cleanse with NS, pat dry. Apply Liquid barrier film to periwound skin (allow to dry). Loosely pack cavity with hydrofiber ribbon (Aquacel) as primary dressing to absorb and fill dead space, leaving ~2 inches out to wick. Cover with silicone foam with border. Change every other day and PRN if soiled.         SECONDARY DISCHARGE DIAGNOSES  Diagnosis: Atrial fibrillation  Assessment and Plan of Treatment: You were noted to have an abnormal and fast heart rhythm. You were started on a blood thinner, however had a drop in your blood counts and evidence of blood in the stool. We discussed the risks vs benefits of the blood thinner and you elected to discontinue this given your risks for bleeding.  Please follow up with Cardiology within 1 week.      Diagnosis: Breast cancer  Assessment and Plan of Treatment: We held your ibrance in setting of your infection. Continue to hold this until you complete your course of antibiotics. Follow up with your Oncologists.    Diagnosis: Anemia  Assessment and Plan of Treatment: Your hemoglobin levels were stable between 7-8 while you were hospitalized. We recommended a blood transfusion to improve your levels to above 8 given your cardiac history, however you elected to forego a blood transfusion. Follow up with your PCP in 1 week for safe transitions of care to discuss your recent hospitalizations and for repeat bloodwork to make sure your Hb remains stable. Follow up with GI outpatient to discuss having an endosocpy done.     PRINCIPAL DISCHARGE DIAGNOSIS  Diagnosis: Cellulitis of submandibular region  Assessment and Plan of Treatment: You were transferred to [facility] and evaluated by our ENT, OMFS, and ID specialists. You were started on IV antibiotics and had an incision and drainage procedure done on 4/2. Continue the oral antibiotics on discharge as prescribed until 4/10. It is very important that you complete the entire course. Continue wound care and follow up with OMFS after you are discharged.  Topical recommendations:   Left submandibular: Cleanse with NS, pat dry. Apply Liquid barrier film to periwound skin (allow to dry). Loosely pack cavity with hydrofiber ribbon (Aquacel) as primary dressing to absorb and fill dead space, leaving ~2 inches out to wick. Cover with silicone foam with border. Change every other day and PRN if soiled.         SECONDARY DISCHARGE DIAGNOSES  Diagnosis: Atrial fibrillation  Assessment and Plan of Treatment: You were noted to have an abnormal and fast heart rhythm. You were started on a blood thinner, however had a drop in your blood counts and evidence of blood in the stool. We discussed the risks vs benefits of the blood thinner and you elected to discontinue this given your risks for bleeding.  Please follow up with Cardiology within 1 week.      Diagnosis: Breast cancer  Assessment and Plan of Treatment: We held your ibrance in setting of your infection. Continue to hold this until you complete your course of antibiotics. Follow up with your Oncologists.    Diagnosis: Anemia  Assessment and Plan of Treatment: Your hemoglobin levels were stable between 7-8 while you were hospitalized. We recommended a blood transfusion to improve your levels to above 8 given your cardiac history, however you elected to forego a blood transfusion. Follow up with your PCP in 1 week for safe transitions of care to discuss your recent hospitalizations and for repeat bloodwork to make sure your Hb remains stable. Follow up with GI outpatient to discuss having an endosocpy done. Resume your aspirin for coronary artery disease but monitor for any signs or symptoms of bleeding (such as dark stool).

## 2025-04-04 NOTE — PROGRESS NOTE ADULT - PROBLEM SELECTOR PLAN 3
-Hx of stage IV breast cancer. ER+/OH+/Her2 negative with metastases to the bones, ?colon on Ibrance/Femara since 11/2021 now with L3 lesion on Xgeva  -Follows with Dr. Bam Cunha at [facility]  -CTA chest (03/28/2025) demonstrated no acute PE, diffuse osteosclerotic metastatic disease, 4 x 2.6 cm mass inferior quadrant left breast containing coarse calcifications and 0.7 cm short axis left axillary LN  -Recommended to hold Ibrance during hospitalization as per Oncology recommendations    -Continue Letrozole 2.5 mg daily

## 2025-04-04 NOTE — ADVANCED PRACTICE NURSE CONSULT - ASSESSMENT
General: A&Ox4, able to turn independent in bed, continent of urine and stool. Skin warm, dry with increased moisture in intertriginous folds, scattered areas of hyperpigmentation and hypopigmentation, scattered areas of ecchymosis without hematoma on bilateral upper extremities.     Left submandibular: S/P I&D on 4/2 by OMFS. Measuring [id number]. Presenting as moist agranular and <10% moist yellow fibrinslough. Small serosanguinous drainage, no odor. Periwound with blanchable erythema and irregularly bordered denudation at 6'clock exposing moist red dermis. No increased warmth, no edema, no overt signs of infection noted at this time. Goals of care: monitor for tissue type changes and packing of dead space.

## 2025-04-04 NOTE — PROGRESS NOTE ADULT - PROBLEM SELECTOR PLAN 1
-3/28/25 CT soft tissue neck w/: submandibular space superficial and deep infiltration suggestive of cellulitis with no abscess, asymmetric fullness left aryepiglottic fold and diffuse sclerotic metastases.   ESR: 70, CRP: 285  Lactate: 3.40->2.00  Procalcitonin: 1.14   Blood cultures (03/28/25) NGTD   Wound culture (03/29/25) growth with few Parvimonas micra and Peptostreptococcus species  ENT, OMFS, and ID consults appreciated    -s/p I&D on 4/2 with OMFS, ENT now signed off  -Vanc/zosyn -> Unasyn on 4/2, continue while inpatient, transition to augmentin BID on discharge until 4/10  -f/u wound cultures  -Family needs wound care teaching

## 2025-04-05 LAB
CULTURE RESULTS: SIGNIFICANT CHANGE UP
CULTURE RESULTS: SIGNIFICANT CHANGE UP
SPECIMEN SOURCE: SIGNIFICANT CHANGE UP
SPECIMEN SOURCE: SIGNIFICANT CHANGE UP